# Patient Record
Sex: FEMALE | Race: WHITE | Employment: OTHER | URBAN - METROPOLITAN AREA
[De-identification: names, ages, dates, MRNs, and addresses within clinical notes are randomized per-mention and may not be internally consistent; named-entity substitution may affect disease eponyms.]

---

## 2019-02-04 ENCOUNTER — TELEPHONE (OUTPATIENT)
Dept: ORTHOPEDIC SURGERY | Age: 74
End: 2019-02-04

## 2019-02-04 ENCOUNTER — OFFICE VISIT (OUTPATIENT)
Dept: ORTHOPEDIC SURGERY | Age: 74
End: 2019-02-04
Payer: COMMERCIAL

## 2019-02-04 VITALS
WEIGHT: 180 LBS | SYSTOLIC BLOOD PRESSURE: 133 MMHG | HEIGHT: 64 IN | HEART RATE: 72 BPM | DIASTOLIC BLOOD PRESSURE: 73 MMHG | BODY MASS INDEX: 30.73 KG/M2

## 2019-02-04 DIAGNOSIS — M25.512 LEFT SHOULDER PAIN, UNSPECIFIED CHRONICITY: Primary | ICD-10-CM

## 2019-02-04 PROCEDURE — 99204 OFFICE O/P NEW MOD 45 MIN: CPT | Performed by: ORTHOPAEDIC SURGERY

## 2019-02-12 ENCOUNTER — OFFICE VISIT (OUTPATIENT)
Dept: ORTHOPEDIC SURGERY | Age: 74
End: 2019-02-12
Payer: COMMERCIAL

## 2019-02-12 VITALS
BODY MASS INDEX: 30.73 KG/M2 | HEIGHT: 64 IN | WEIGHT: 180 LBS | HEART RATE: 78 BPM | SYSTOLIC BLOOD PRESSURE: 142 MMHG | DIASTOLIC BLOOD PRESSURE: 78 MMHG

## 2019-02-12 DIAGNOSIS — M19.012 ARTHROSIS OF LEFT ACROMIOCLAVICULAR JOINT: ICD-10-CM

## 2019-02-12 DIAGNOSIS — M75.112 INCOMPLETE TEAR OF LEFT ROTATOR CUFF: Primary | ICD-10-CM

## 2019-02-12 PROCEDURE — 99213 OFFICE O/P EST LOW 20 MIN: CPT | Performed by: ORTHOPAEDIC SURGERY

## 2019-02-18 ENCOUNTER — OFFICE VISIT (OUTPATIENT)
Dept: ORTHOPEDIC SURGERY | Age: 74
End: 2019-02-18
Payer: COMMERCIAL

## 2019-02-18 VITALS
SYSTOLIC BLOOD PRESSURE: 136 MMHG | HEIGHT: 64 IN | BODY MASS INDEX: 30.73 KG/M2 | DIASTOLIC BLOOD PRESSURE: 76 MMHG | HEART RATE: 78 BPM | WEIGHT: 180 LBS

## 2019-02-18 DIAGNOSIS — M75.112 INCOMPLETE TEAR OF LEFT ROTATOR CUFF: Primary | ICD-10-CM

## 2019-02-18 DIAGNOSIS — M19.012 ARTHROSIS OF LEFT ACROMIOCLAVICULAR JOINT: ICD-10-CM

## 2019-02-18 PROCEDURE — 99213 OFFICE O/P EST LOW 20 MIN: CPT | Performed by: ORTHOPAEDIC SURGERY

## 2019-02-18 PROCEDURE — 20610 DRAIN/INJ JOINT/BURSA W/O US: CPT | Performed by: ORTHOPAEDIC SURGERY

## 2019-05-20 ENCOUNTER — OFFICE VISIT (OUTPATIENT)
Dept: ORTHOPEDIC SURGERY | Age: 74
End: 2019-05-20
Payer: COMMERCIAL

## 2019-05-20 VITALS
HEIGHT: 64 IN | BODY MASS INDEX: 30.71 KG/M2 | WEIGHT: 179.9 LBS | HEART RATE: 65 BPM | DIASTOLIC BLOOD PRESSURE: 65 MMHG | SYSTOLIC BLOOD PRESSURE: 120 MMHG

## 2019-05-20 DIAGNOSIS — M75.112 INCOMPLETE TEAR OF LEFT ROTATOR CUFF: Primary | ICD-10-CM

## 2019-05-20 DIAGNOSIS — M19.012 ARTHROSIS OF LEFT ACROMIOCLAVICULAR JOINT: ICD-10-CM

## 2019-05-20 PROCEDURE — L3670 SO ACRO/CLAV CAN WEB PRE OTS: HCPCS | Performed by: ORTHOPAEDIC SURGERY

## 2019-05-20 PROCEDURE — 99214 OFFICE O/P EST MOD 30 MIN: CPT | Performed by: ORTHOPAEDIC SURGERY

## 2019-05-20 NOTE — PROGRESS NOTES
History of Present Illness:  Rishi Bell is a 72 y.o. female here today for follow up reevaluation of the left shoulder. She was seen last week with a partial thickness rotator cuff tear, biceps tendinitis and acromioclavicular joint osteoarthritis as well as stiffness in her shoulder. Her last steroid injection was on 2/18/19. She is here today because she has elected to proceed with surgery and would like to undergo arthroscopic left shoulder rotator cuff repair, biceps tenotomy, capsular releases, subacromial decompression, distal clavicle excision. She still has to be cleared by her cardiologist as well as her regular doctor. She denies any new injuries. She denies any other current complaints this time.       Pain Assessment  Location of Pain: Shoulder  Location Modifiers: Left  Severity of Pain: 4  Quality of Pain: Sharp, Aching  Duration of Pain: Persistent  Frequency of Pain: Intermittent  Aggravating Factors: (raising arm )  Limiting Behavior: Yes  Relieving Factors: Rest  Result of Injury: No  Work-Related Injury: No  Are there other pain locations you wish to document?: No    Past Medical History:   Diagnosis Date    Anxiety     CAD (coronary artery disease) 2004    Chronic low back pain     Foot fracture 09/2016    right     GERD (gastroesophageal reflux disease)     Hearing loss     History of blood transfusion     History of MI (myocardial infarction)     HLD (hyperlipidemia)     HTN (hypertension)     IBS (irritable bowel syndrome)     Lumbar radiculopathy     Neuropathy     feet    Spinal stenosis     Type 2 diabetes mellitus (HCC)         Past Surgical History:   Procedure Laterality Date    BACK SURGERY  10/31/2016    L4-L5 RE-EXPLORATION AND REPLACEMENT OF INTERBODY BONE GRAFT    BLADDER SUSPENSION      CARDIAC SURGERY      TRIPLE BYPASS    CARDIAC SURGERY      STENT     COLONOSCOPY      OTHER SURGICAL HISTORY N/A 09/01/2016    L3-L4, L4-L5 TRANSFORAMINAL LUMBAR INTERBODY FUSION WITH AIRO    SHOULDER SURGERY Right     SHOULDER TEAR REPAIR       Family History   Problem Relation Age of Onset    Alzheimer's Disease Mother     Heart Disease Father        Social History     Socioeconomic History    Marital status:      Spouse name: None    Number of children: None    Years of education: None    Highest education level: None   Occupational History    None   Social Needs    Financial resource strain: None    Food insecurity:     Worry: None     Inability: None    Transportation needs:     Medical: None     Non-medical: None   Tobacco Use    Smoking status: Never Smoker    Smokeless tobacco: Never Used   Substance and Sexual Activity    Alcohol use: No    Drug use: No    Sexual activity: None   Lifestyle    Physical activity:     Days per week: None     Minutes per session: None    Stress: None   Relationships    Social connections:     Talks on phone: None     Gets together: None     Attends Restoration service: None     Active member of club or organization: None     Attends meetings of clubs or organizations: None     Relationship status: None    Intimate partner violence:     Fear of current or ex partner: None     Emotionally abused: None     Physically abused: None     Forced sexual activity: None   Other Topics Concern    None   Social History Narrative    None       Current Outpatient Medications   Medication Sig Dispense Refill    gabapentin (NEURONTIN) 100 MG capsule Take 100 mg by mouth 3 tabs in morning and evening, 2 tabs during day      nitrofurantoin (MACRODANTIN) 100 MG capsule Take 100 mg by mouth daily      carvedilol (COREG) 12.5 MG tablet Take 1 tablet by mouth 2 times daily (with meals) 60 tablet 3    amLODIPine (NORVASC) 5 MG tablet Take 1 tablet by mouth daily 30 tablet 3    glimepiride (AMARYL) 2 MG tablet Take 2 mg by mouth every morning (before breakfast)      diphenoxylate-atropine (LOMOTIL) 2.5-0.025 MG per tablet Take 1 tablet by mouth 4 times daily as needed for Diarrhea      potassium chloride (MICRO-K) 10 MEQ CR capsule Take 10 mEq by mouth daily      furosemide (LASIX) 40 MG tablet Take 40 mg by mouth daily      cetirizine (ZYRTEC) 10 MG tablet Take 10 mg by mouth daily       Liraglutide (VICTOZA) 18 MG/3ML SOPN SC injection INJECT 1.8 MG UNDER THE SKIN DAILY      fluticasone (FLONASE) 50 MCG/ACT nasal spray 1 spray by Nasal route as needed daily.  ZETIA 10 MG tablet 10 mg nightly       losartan (COZAAR) 100 MG tablet 100 mg nightly       metFORMIN (GLUCOPHAGE) 1000 MG tablet 1,000 mg 2 times daily (with meals)       ondansetron (ZOFRAN) 8 MG tablet 8 mg as needed       promethazine (PHENERGAN) 25 MG tablet 25 mg every 6 hours as needed       sertraline (ZOLOFT) 100 MG tablet 100 mg nightly        No current facility-administered medications for this visit. Allergies   Allergen Reactions    Metoclopramide Hcl     Morphine Other (See Comments)     HALLUCINATIONS    Percocet [Oxycodone-Acetaminophen] Itching     PATIENT CAN TAKE BUT NEEDS TO TAKE BENADRYL WITH IT       Vital signs:  /65   Pulse 65   Ht 5' 4.02\" (1.626 m)   Wt 179 lb 14.3 oz (81.6 kg)   BMI 30.86 kg/m²      Neuro: Alert & oriented x 3,  normal,  no focal deficits noted. Normal affect. Eyes: sclera clear  Ears: Normal external ear  Mouth:  No perioral lesions  Pulm: Respirations unlabored and regular  Pulse: Regular rate and rhythm   Skin: Warm, well perfused    Constitutional: The physical examination finds the patient to be well-developed and well-nourished. The patient is alert and oriented x3 and was cooperative throughout the visit. LEFT Shoulder Examination:    Inspection:  Held in a normal posture. Normal contour at the acromioclavicular joint. No swelling, ecchymosis, or erythema about the shoulder. No atrophy appreciated.     Palpation:  Tenderness over the anterior subacromial space/rotator cuff footprint, tenderness over the bicipital groove, tenderness over the acromioclavicular joint and posterior capsule. No subacromial crepitus. Range of Motion:  Normal scapulothoracic rhythm. Limited internal rotation to T9, crossarm adduction demonstrates tightness in the posterior shoulder with approximately 20 cm to the contralateral antecubital fossa. Strength:  Normal teres minor and subscapularis muscle strength. Supraspinatus weakness 4/5, infraspinatus 4+5     Stability: No anterior instability. No posterior instability. Special Tests:  Crossover sign is negative. Belly press sign is negative. Lift off sign is negative. Impingement findings are positive. Labral findings are negative. Speed sign and Yergason signs are both negative. Other findings: The skin is warm dry and well perfused. Distally neurovascularly intact. Sensation is intact to light touch over the deltoid. Comparison RIGHT Shoulder Examination:    Inspection:  Held in a normal posture. Normal contour at the acromioclavicular joint. No swelling, ecchymosis, or erythema about the shoulder. No atrophy appreciated. Palpation:  No subacromial crepitus. Range of Motion: Full passive and active ROM. Normal scapulothoracic rhythm. Strength:  Normal supraspinatus, infraspinatus, and subscapularis muscle strength. Stability: No anterior instability. No posterior instability. Special Tests:  Crossover sign is negative. Belly press sign is negative. Lift off sign is negative. Impingement findings are negative. Labral findings are negative. Speed sign and Yergason signs are both negative. Other findings: The skin is warm dry and well perfused. Distally neurovascularly intact. Sensation is intact to light touch over the deltoid. Radiology:     MRI imaging:  CONCLUSION:   1. Moderate-severe tendinopathy of supraspinatus and infraspinatus with 2 x 0.5cm interstitial    insertional tear, progressed from the previous study.    2. Moderate glenohumeral arthrosis with spurring, subcortical pseudocysts and chronic labral    tear, new since the prior study. 3. Chronic active AC arthropathy, progressed. Assessment :  Left shoulder pain secondary to partial thickness rotator cuff tear, bicipital tendinopathy, impingement, posterior capsular tightness, and acromioclavicular joint osteoarthritis. Impression:  Encounter Diagnoses   Name Primary?  Arthrosis of left acromioclavicular joint Yes    Incomplete tear of left rotator cuff        Office Procedures:  Orders Placed This Encounter   Procedures    DJO ultrasling IV Shoulder Sling     Patient was prescribed a DJO Ultrasling IV Shoulder Brace. The left shoulder will require stabilization / immobilization from this orthosis. The orthosis will assist in protecting the affected area, provide functional support and facilitate healing. The device was ordered and fit on 5/20/19. The patient was educated and fit by a healthcare professional with expert knowledge and specialization in brace application while under the direct supervision of the treating physician. Verbal and written instructions for the use of and application of this item were provided. They were instructed to contact the office immediately should the brace result in increased pain, decreased sensation, increased swelling or worsening of the condition. Plan: I discussed with the patient nonoperative and operative treatment options. She is a all nonoperative treatment options. She would like to proceed with a left shoulder arthroscopic rotator cuff debridement versus repair, biceps tenotomy, distal clavicle excision, subacromial decompression, posterior capsular release, and all other indicated procedures. Risks, benefits, advantages, disadvantages, potential complications, and anticipated postoperative course of all treatment options were discussed at length.   She agreed to pursue these surgical treatment Arthroscopic Knee and Shoulder Surgery    This dictation was performed with a verbal recognition program (DRAGON) and it was checked for errors. It is possible that there are still dictated errors within this office note. If so, please bring any errors to my attention for an addendum. All efforts were made to ensure that this office note is accurate.

## 2019-05-31 NOTE — PROGRESS NOTES
[facility] / [facility] ([facility]) [address] [facility] [facility], [address]    Acknowledgment of Informed Consent for Surgical or Medical Procedure and Sedation  I agree to allow doctor(s) Dee Diaz and his/her associates or assistants, including residents and/or other qualified medical practitioner to perform the following medical treatment or procedure and to administer or direct the administration of sedation as necessary:  Procedure(s): LEFT SHOULDER ARTHROSCOPY, ROTATOR CUFF REPAIR, SUBACROMIAL DECOMPRESSION, LABRAL DEBRIDEMENT  My doctor has explained the following regarding the proposed procedure:   the explanation of the procedure   the benefits of the procedure   the potential problems that might occur during recuperation   the risks and side effects of the procedure which could include but are not limited to severe blood loss, infection, stroke or death   the benefits, risks and side effect of alternative procedures including the consequences of declining this procedure or any alternative procedures   the likelihood of achieving satisfactory results. I acknowledge no guarantee or assurance has been made to me regarding the results. I understand that during the course of this treatment/procedure, unforeseen conditions can occur which require an additional or different procedure. I agree to allow my physician or assistants to perform such extension of the original procedure as they may find necessary. I understand that sedation will often result in temporary impairment of memory and fine motor skills and that sedation can occasionally progress to a state of deep sedation or general anesthesia. I understand the risks of anesthesia for surgery include, but are not limited to, sore throat, hoarseness, injury to face, mouth, or teeth; nausea; headache; injury to blood vessels or nerves; death, brain damage, or paralysis.     I understand that if I have a Limitation of Treatment order

## 2019-06-05 ENCOUNTER — ANESTHESIA EVENT (OUTPATIENT)
Dept: OPERATING ROOM | Age: 74
End: 2019-06-05
Payer: MEDICARE

## 2019-06-05 RX ORDER — ASPIRIN 81 MG/1
81 TABLET, CHEWABLE ORAL DAILY
Status: ON HOLD | COMMUNITY
End: 2019-06-06 | Stop reason: HOSPADM

## 2019-06-05 RX ORDER — OMEGA-3/DHA/EPA/FISH OIL 60 MG-90MG
CAPSULE ORAL
COMMUNITY

## 2019-06-05 RX ORDER — FUROSEMIDE 40 MG/1
40 TABLET ORAL 2 TIMES DAILY
Status: ON HOLD | COMMUNITY
End: 2019-06-06

## 2019-06-05 RX ORDER — CLOPIDOGREL BISULFATE 75 MG/1
75 TABLET ORAL DAILY
COMMUNITY

## 2019-06-05 NOTE — PROGRESS NOTES
[address]                          Date of Procedure 6/6/19  Time of Procedure 0800    PRIOR TO PROCEDURE DATE:  1. Please follow any guidelines/instructions prior to your procedure as advised by your surgeon. 2. Arrange for someone to drive you home and be with you for the first 24 hours after discharge for your safety after your procedure for which you received sedation. Ensure it is someone we can share information with regarding your discharge. 3. You must contact your surgeon for instructions IF:   You are taking any blood thinners, aspirin, anti-inflammatory or vitamin E.   There is a change in your physical condition such as a cold, fever, rash, cuts, sores or any other infection, especially near your surgical site. 4. Do not drink alcohol the day before or day of your procedure. 5. A Pre-op History and Physical for surgery MUST be completed by your Physician or Urgent Care within 30 days of your procedure date. Please bring a copy with you on the day of your procedure and along with any other testing performed. THE DAY OF YOUR PROCEDURE:  1. Follow instructions for ARRIVAL TIME as DIRECTED BY YOUR SURGEON. If your surgeon does not give you a specific arrival time, please arrive at 0600.    2. Enter the MAIN entrance from [facility] and follow the signs to the free [employer] or [employer] parking (offered free of charge 6am-5pm). 3. Enter the Main Entrance of the hospital (do not enter from the lower level of the parking garage). Upon entrance, check in with the  at the main desk on your left. If no one is available at the desk, proceed into the [facility] Waiting Room and go through the door directly into the [facility]. There is a Check-in desk ACROSS from Room 5 (marked with a sign hanging from the ceiling). The phone number for the surgery center is [phone].     4. Please call [phone] option #2 option #2 you may bring a bag with personal items. Please have any large items you may need brought in by your family after your arrival to your hospital room. 15. If you have a Living Will or Durable Power of , please bring a copy on the day of your procedure. 15. With your permission, one family member may accompany you while you are being prepared for surgery. Once you are ready, additional family members may join you. HOW WE KEEP YOU SAFE and WORK TO PREVENT SURGICAL SITE INFECTIONS:  1. Health care workers should always check your ID bracelet to verify your name and birth date. You will be asked many times to state your name, date of birth, and allergies. 2. Health care workers should always clean their hands with soap or alcohol gel before providing care to you. It is okay to ask anyone if they cleaned their hands before they touch you. 3. You will be actively involved in verifying the type of procedure you are having and ensuring the correct surgical site. This will be confirmed multiple times prior to your procedure. Do NOT marilu your surgery site UNLESS instructed to by your surgeon. 4. Do not shave or wax for 72 hours prior to procedure near your operative site. Shaving with a razor can irritate your skin and make it easier to develop an infection. On the day of your procedure, any hair that needs to be removed near the surgical site will be clipped by a healthcare worker using a special clippers designed to avoid skin irritation. 5. When you are in the operating room, your surgical site will be cleansed with a special soap, and in most cases, you will be given an antibiotic before the surgery begins. What to expect AFTER YOUR PROCEDURE:  1. Immediately following your procedure, your will be taken to the PACU for the first phase of your recovery.   Your nurse will help you recover from any potential side effects of anesthesia, such as extreme drowsiness, changes in your vital signs or breathing patterns. Nausea, headache, muscle aches, or sore throat may also occur after anesthesia. Your nurse will help you manage these potential side effects. 2. For comfort and safety, arrange to have someone at home with you for the first 24 hours after discharge. 3. You and your family will be given written instructions about your diet, activity, dressing care, medications, and return visits. 4. Once at home, should issues with nausea, pain, or bleeding occur, or should you notice any signs of infection, you should call your surgeon. 5. Always clean your hands before and after caring for your wound. Do not let your family touch your surgery site without cleaning their hands. 6. Narcotic pain medications can cause significant constipation. You may want to add a stool softener to your postoperative medication schedule or speak to your surgeon on how best to manage this SIDE EFFECT. SPECIAL INSTRUCTIONS   Thank you for allowing us to care for you. We strive to exceed your expectations in the delivery of care and service provided to you and your family. If you need to contact us for any reason, please call us at [phone]    Instructions reviewed with patient during preadmission testing phone interview. Ivan Crabtree. 6/5/2019 .12:53 PM      ADDITIONAL EDUCATIONAL INFORMATION REVIEWED PER PHONE WITH YOU AND/OR YOUR FAMILY:  No Bring a urine sample on day of surgery  Yes Pain Goal-Taking Control of Your Pain  Yes FAQs about Surgical Site Infections  Yes Hibiclens® Bathing Instructions   No Antibacterial Soap   PC:[id number]} [employer]ano® Wipes Bathing Instructions (Obtained from: [email or web address] pdf )  No  Incentive Spirometer Education

## 2019-06-06 ENCOUNTER — HOSPITAL ENCOUNTER (OUTPATIENT)
Age: 74
Setting detail: OUTPATIENT SURGERY
Discharge: HOME | End: 2019-06-06
Attending: ORTHOPAEDIC SURGERY | Admitting: ORTHOPAEDIC SURGERY
Payer: MEDICARE

## 2019-06-06 ENCOUNTER — ANESTHESIA (OUTPATIENT)
Dept: OPERATING ROOM | Age: 74
End: 2019-06-06
Payer: MEDICARE

## 2019-06-06 VITALS
RESPIRATION RATE: 16 BRPM | OXYGEN SATURATION: 95 % | BODY MASS INDEX: 30.39 KG/M2 | DIASTOLIC BLOOD PRESSURE: 65 MMHG | TEMPERATURE: 97.3 F | HEART RATE: 75 BPM | WEIGHT: 178 LBS | SYSTOLIC BLOOD PRESSURE: 133 MMHG | HEIGHT: 64 IN

## 2019-06-06 VITALS — SYSTOLIC BLOOD PRESSURE: 164 MMHG | OXYGEN SATURATION: 97 % | TEMPERATURE: 96.8 F | DIASTOLIC BLOOD PRESSURE: 79 MMHG

## 2019-06-06 PROBLEM — M75.112 INCOMPLETE ROTATOR CUFF TEAR OR RUPTURE OF LEFT SHOULDER, NOT SPECIFIED AS TRAUMATIC: Status: ACTIVE | Noted: 2019-06-06

## 2019-06-06 LAB
GLUCOSE BLD-MCNC: 141 MG/DL (ref 70–99)
GLUCOSE BLD-MCNC: 150 MG/DL (ref 70–99)
PERFORMED ON: ABNORMAL
PERFORMED ON: ABNORMAL

## 2019-06-06 PROCEDURE — 7100000010 HC PHASE II RECOVERY - FIRST 15 MIN: Performed by: ORTHOPAEDIC SURGERY

## 2019-06-06 PROCEDURE — 2580000003 HC RX 258: Performed by: ANESTHESIOLOGY

## 2019-06-06 PROCEDURE — 6360000002 HC RX W HCPCS: Performed by: NURSE ANESTHETIST, CERTIFIED REGISTERED

## 2019-06-06 PROCEDURE — 6360000002 HC RX W HCPCS: Performed by: ORTHOPAEDIC SURGERY

## 2019-06-06 PROCEDURE — 3700000001 HC ADD 15 MINUTES (ANESTHESIA): Performed by: ORTHOPAEDIC SURGERY

## 2019-06-06 PROCEDURE — 64415 NJX AA&/STRD BRCH PLXS IMG: CPT | Performed by: ANESTHESIOLOGY

## 2019-06-06 PROCEDURE — 7100000011 HC PHASE II RECOVERY - ADDTL 15 MIN: Performed by: ORTHOPAEDIC SURGERY

## 2019-06-06 PROCEDURE — 7100000000 HC PACU RECOVERY - FIRST 15 MIN: Performed by: ORTHOPAEDIC SURGERY

## 2019-06-06 PROCEDURE — 2500000003 HC RX 250 WO HCPCS: Performed by: NURSE ANESTHETIST, CERTIFIED REGISTERED

## 2019-06-06 PROCEDURE — 2709999900 HC NON-CHARGEABLE SUPPLY: Performed by: ORTHOPAEDIC SURGERY

## 2019-06-06 PROCEDURE — 3600000004 HC SURGERY LEVEL 4 BASE: Performed by: ORTHOPAEDIC SURGERY

## 2019-06-06 PROCEDURE — 2720000010 HC SURG SUPPLY STERILE: Performed by: ORTHOPAEDIC SURGERY

## 2019-06-06 PROCEDURE — L3650 SO 8 ABD RESTRAINT PRE OTS: HCPCS | Performed by: ORTHOPAEDIC SURGERY

## 2019-06-06 PROCEDURE — 3600000014 HC SURGERY LEVEL 4 ADDTL 15MIN: Performed by: ORTHOPAEDIC SURGERY

## 2019-06-06 PROCEDURE — 3700000000 HC ANESTHESIA ATTENDED CARE: Performed by: ORTHOPAEDIC SURGERY

## 2019-06-06 PROCEDURE — 7100000001 HC PACU RECOVERY - ADDTL 15 MIN: Performed by: ORTHOPAEDIC SURGERY

## 2019-06-06 PROCEDURE — 6360000002 HC RX W HCPCS: Performed by: ANESTHESIOLOGY

## 2019-06-06 PROCEDURE — 2580000003 HC RX 258: Performed by: ORTHOPAEDIC SURGERY

## 2019-06-06 PROCEDURE — C1713 ANCHOR/SCREW BN/BN,TIS/BN: HCPCS | Performed by: ORTHOPAEDIC SURGERY

## 2019-06-06 DEVICE — ANCHOR SUT L14.7MM DIA5.5MM 2 NO2 TIGERTAIL FULL THRD: Type: IMPLANTABLE DEVICE | Site: SHOULDER | Status: FUNCTIONAL

## 2019-06-06 RX ORDER — LIDOCAINE HYDROCHLORIDE 20 MG/ML
INJECTION, SOLUTION INTRAVENOUS PRN
Status: DISCONTINUED | OUTPATIENT
Start: 2019-06-06 | End: 2019-06-06 | Stop reason: SDUPTHER

## 2019-06-06 RX ORDER — ROCURONIUM BROMIDE 10 MG/ML
INJECTION, SOLUTION INTRAVENOUS PRN
Status: DISCONTINUED | OUTPATIENT
Start: 2019-06-06 | End: 2019-06-06 | Stop reason: SDUPTHER

## 2019-06-06 RX ORDER — ONDANSETRON 2 MG/ML
INJECTION INTRAMUSCULAR; INTRAVENOUS PRN
Status: DISCONTINUED | OUTPATIENT
Start: 2019-06-06 | End: 2019-06-06 | Stop reason: SDUPTHER

## 2019-06-06 RX ORDER — MIDAZOLAM HYDROCHLORIDE 1 MG/ML
INJECTION INTRAMUSCULAR; INTRAVENOUS PRN
Status: DISCONTINUED | OUTPATIENT
Start: 2019-06-06 | End: 2019-06-06 | Stop reason: SDUPTHER

## 2019-06-06 RX ORDER — FENTANYL CITRATE 50 UG/ML
INJECTION, SOLUTION INTRAMUSCULAR; INTRAVENOUS
Status: COMPLETED
Start: 2019-06-06 | End: 2019-06-06

## 2019-06-06 RX ORDER — PROMETHAZINE HYDROCHLORIDE 25 MG/ML
6.25 INJECTION, SOLUTION INTRAMUSCULAR; INTRAVENOUS
Status: DISCONTINUED | OUTPATIENT
Start: 2019-06-06 | End: 2019-06-06 | Stop reason: HOSPADM

## 2019-06-06 RX ORDER — SODIUM CHLORIDE, SODIUM LACTATE, POTASSIUM CHLORIDE, CALCIUM CHLORIDE 600; 310; 30; 20 MG/100ML; MG/100ML; MG/100ML; MG/100ML
INJECTION, SOLUTION INTRAVENOUS CONTINUOUS
Status: DISCONTINUED | OUTPATIENT
Start: 2019-06-06 | End: 2019-06-06 | Stop reason: HOSPADM

## 2019-06-06 RX ORDER — PROPOFOL 10 MG/ML
INJECTION, EMULSION INTRAVENOUS PRN
Status: DISCONTINUED | OUTPATIENT
Start: 2019-06-06 | End: 2019-06-06 | Stop reason: SDUPTHER

## 2019-06-06 RX ORDER — FENTANYL CITRATE 50 UG/ML
50 INJECTION, SOLUTION INTRAMUSCULAR; INTRAVENOUS EVERY 5 MIN PRN
Status: DISCONTINUED | OUTPATIENT
Start: 2019-06-06 | End: 2019-06-06 | Stop reason: HOSPADM

## 2019-06-06 RX ORDER — ONDANSETRON 2 MG/ML
4 INJECTION INTRAMUSCULAR; INTRAVENOUS
Status: DISCONTINUED | OUTPATIENT
Start: 2019-06-06 | End: 2019-06-06 | Stop reason: HOSPADM

## 2019-06-06 RX ORDER — HYDRALAZINE HYDROCHLORIDE 20 MG/ML
5 INJECTION INTRAMUSCULAR; INTRAVENOUS EVERY 10 MIN PRN
Status: DISCONTINUED | OUTPATIENT
Start: 2019-06-06 | End: 2019-06-06 | Stop reason: HOSPADM

## 2019-06-06 RX ORDER — ROPIVACAINE HYDROCHLORIDE 5 MG/ML
INJECTION, SOLUTION EPIDURAL; INFILTRATION; PERINEURAL
Status: COMPLETED
Start: 2019-06-06 | End: 2019-06-06

## 2019-06-06 RX ORDER — MIDAZOLAM HYDROCHLORIDE 1 MG/ML
INJECTION INTRAMUSCULAR; INTRAVENOUS
Status: COMPLETED
Start: 2019-06-06 | End: 2019-06-06

## 2019-06-06 RX ORDER — CEFAZOLIN SODIUM 2 G/50ML
2 SOLUTION INTRAVENOUS ONCE
Status: COMPLETED | OUTPATIENT
Start: 2019-06-06 | End: 2019-06-06

## 2019-06-06 RX ORDER — LABETALOL HYDROCHLORIDE 5 MG/ML
5 INJECTION, SOLUTION INTRAVENOUS EVERY 10 MIN PRN
Status: DISCONTINUED | OUTPATIENT
Start: 2019-06-06 | End: 2019-06-06 | Stop reason: HOSPADM

## 2019-06-06 RX ORDER — MAGNESIUM HYDROXIDE 1200 MG/15ML
LIQUID ORAL CONTINUOUS PRN
Status: COMPLETED | OUTPATIENT
Start: 2019-06-06 | End: 2019-06-06

## 2019-06-06 RX ORDER — PANTOPRAZOLE SODIUM 40 MG/1
40 GRANULE, DELAYED RELEASE ORAL
COMMUNITY

## 2019-06-06 RX ORDER — FENTANYL CITRATE 50 UG/ML
25 INJECTION, SOLUTION INTRAMUSCULAR; INTRAVENOUS EVERY 5 MIN PRN
Status: DISCONTINUED | OUTPATIENT
Start: 2019-06-06 | End: 2019-06-06 | Stop reason: HOSPADM

## 2019-06-06 RX ORDER — DEXAMETHASONE SODIUM PHOSPHATE 4 MG/ML
INJECTION, SOLUTION INTRA-ARTICULAR; INTRALESIONAL; INTRAMUSCULAR; INTRAVENOUS; SOFT TISSUE PRN
Status: DISCONTINUED | OUTPATIENT
Start: 2019-06-06 | End: 2019-06-06 | Stop reason: SDUPTHER

## 2019-06-06 RX ORDER — FENTANYL CITRATE 50 UG/ML
INJECTION, SOLUTION INTRAMUSCULAR; INTRAVENOUS PRN
Status: DISCONTINUED | OUTPATIENT
Start: 2019-06-06 | End: 2019-06-06 | Stop reason: SDUPTHER

## 2019-06-06 RX ORDER — ROPIVACAINE HYDROCHLORIDE 5 MG/ML
INJECTION, SOLUTION EPIDURAL; INFILTRATION; PERINEURAL PRN
Status: DISCONTINUED | OUTPATIENT
Start: 2019-06-06 | End: 2019-06-06 | Stop reason: SDUPTHER

## 2019-06-06 RX ADMIN — ROCURONIUM BROMIDE 50 MG: 10 INJECTION, SOLUTION INTRAVENOUS at 08:27

## 2019-06-06 RX ADMIN — PHENYLEPHRINE HYDROCHLORIDE 80 MCG: 10 INJECTION, SOLUTION INTRAMUSCULAR; INTRAVENOUS; SUBCUTANEOUS at 08:58

## 2019-06-06 RX ADMIN — FENTANYL CITRATE 100 MCG: 50 INJECTION INTRAMUSCULAR; INTRAVENOUS at 07:45

## 2019-06-06 RX ADMIN — SODIUM CHLORIDE, SODIUM LACTATE, POTASSIUM CHLORIDE, AND CALCIUM CHLORIDE: 600; 310; 30; 20 INJECTION, SOLUTION INTRAVENOUS at 08:17

## 2019-06-06 RX ADMIN — PHENYLEPHRINE HYDROCHLORIDE 80 MCG: 10 INJECTION, SOLUTION INTRAMUSCULAR; INTRAVENOUS; SUBCUTANEOUS at 08:38

## 2019-06-06 RX ADMIN — SODIUM CHLORIDE, SODIUM LACTATE, POTASSIUM CHLORIDE, AND CALCIUM CHLORIDE: 600; 310; 30; 20 INJECTION, SOLUTION INTRAVENOUS at 07:00

## 2019-06-06 RX ADMIN — DEXAMETHASONE SODIUM PHOSPHATE 4 MG: 4 INJECTION, SOLUTION INTRAMUSCULAR; INTRAVENOUS at 09:30

## 2019-06-06 RX ADMIN — CEFAZOLIN SODIUM 2 G: 2 SOLUTION INTRAVENOUS at 08:29

## 2019-06-06 RX ADMIN — MIDAZOLAM HYDROCHLORIDE 2 MG: 2 INJECTION, SOLUTION INTRAMUSCULAR; INTRAVENOUS at 07:45

## 2019-06-06 RX ADMIN — PHENYLEPHRINE HYDROCHLORIDE 80 MCG: 10 INJECTION, SOLUTION INTRAMUSCULAR; INTRAVENOUS; SUBCUTANEOUS at 08:44

## 2019-06-06 RX ADMIN — LIDOCAINE HYDROCHLORIDE 100 MG: 20 INJECTION, SOLUTION INTRAVENOUS at 08:27

## 2019-06-06 RX ADMIN — PROPOFOL 130 MG: 10 INJECTION, EMULSION INTRAVENOUS at 08:27

## 2019-06-06 RX ADMIN — ONDANSETRON 4 MG: 2 INJECTION INTRAMUSCULAR; INTRAVENOUS at 09:30

## 2019-06-06 RX ADMIN — ROCURONIUM BROMIDE 30 MG: 10 INJECTION, SOLUTION INTRAVENOUS at 09:03

## 2019-06-06 RX ADMIN — ROPIVACAINE HYDROCHLORIDE 35 ML: 5 INJECTION, SOLUTION EPIDURAL; INFILTRATION; PERINEURAL at 07:25

## 2019-06-06 ASSESSMENT — PULMONARY FUNCTION TESTS
PIF_VALUE: 25
PIF_VALUE: 23
PIF_VALUE: 24
PIF_VALUE: 0
PIF_VALUE: 24
PIF_VALUE: 21
PIF_VALUE: 24
PIF_VALUE: 25
PIF_VALUE: 24
PIF_VALUE: 1
PIF_VALUE: 24
PIF_VALUE: 1
PIF_VALUE: 24
PIF_VALUE: 1
PIF_VALUE: 24
PIF_VALUE: 23
PIF_VALUE: 24
PIF_VALUE: 30
PIF_VALUE: 24
PIF_VALUE: 7
PIF_VALUE: 24
PIF_VALUE: 25
PIF_VALUE: 1
PIF_VALUE: 24
PIF_VALUE: 23
PIF_VALUE: 31
PIF_VALUE: 24
PIF_VALUE: 23
PIF_VALUE: 24
PIF_VALUE: 0
PIF_VALUE: 24
PIF_VALUE: 24
PIF_VALUE: 27
PIF_VALUE: 25
PIF_VALUE: 23
PIF_VALUE: 24
PIF_VALUE: 1
PIF_VALUE: 24
PIF_VALUE: 24
PIF_VALUE: 4
PIF_VALUE: 23
PIF_VALUE: 22
PIF_VALUE: 23
PIF_VALUE: 22
PIF_VALUE: 1
PIF_VALUE: 24
PIF_VALUE: 23
PIF_VALUE: 24
PIF_VALUE: 6
PIF_VALUE: 25
PIF_VALUE: 25
PIF_VALUE: 24
PIF_VALUE: 23
PIF_VALUE: 24
PIF_VALUE: 23
PIF_VALUE: 1
PIF_VALUE: 24
PIF_VALUE: 19
PIF_VALUE: 24
PIF_VALUE: 23
PIF_VALUE: 2
PIF_VALUE: 24
PIF_VALUE: 25
PIF_VALUE: 24
PIF_VALUE: 25
PIF_VALUE: 24
PIF_VALUE: 23
PIF_VALUE: 13
PIF_VALUE: 24
PIF_VALUE: 24
PIF_VALUE: 5
PIF_VALUE: 24
PIF_VALUE: 2
PIF_VALUE: 24
PIF_VALUE: 26
PIF_VALUE: 24
PIF_VALUE: 24
PIF_VALUE: 25
PIF_VALUE: 24
PIF_VALUE: 0
PIF_VALUE: 26
PIF_VALUE: 23
PIF_VALUE: 26
PIF_VALUE: 24
PIF_VALUE: 23
PIF_VALUE: 24
PIF_VALUE: 23
PIF_VALUE: 25
PIF_VALUE: 24

## 2019-06-06 ASSESSMENT — PAIN DESCRIPTION - DESCRIPTORS: DESCRIPTORS: ACHING;SHARP

## 2019-06-06 ASSESSMENT — PAIN SCALES - GENERAL
PAINLEVEL_OUTOF10: 0

## 2019-06-06 ASSESSMENT — PAIN - FUNCTIONAL ASSESSMENT
PAIN_FUNCTIONAL_ASSESSMENT: 0-10
PAIN_FUNCTIONAL_ASSESSMENT: 0-10
PAIN_FUNCTIONAL_ASSESSMENT: PREVENTS OR INTERFERES SOME ACTIVE ACTIVITIES AND ADLS

## 2019-06-06 NOTE — PROGRESS NOTES
Call to OR for Dr Gilma Valdovinos. A prescription will be sent to her [facility] for medication for nausea if it is needed. No nausea now. Walked to BR. Steady when up. Voided. Also discussed over the phone the patient can resume her usual anticoagulants today ( she takes low dose aspirin and Plavix). She said she will probably follow her cardiologist direction who said not to stop ASA and restart Plavix the day after surgery. Dressing remains clean. Sling positioning with elbow slightly  lower than hand.

## 2019-06-06 NOTE — ANESTHESIA PROCEDURE NOTES
Peripheral Block    Patient location during procedure: pre-op  Staffing  Anesthesiologist: Sadaf Simons DO  Preanesthetic Checklist  Completed: patient identified, site marked, surgical consent, pre-op evaluation, timeout performed, IV checked, risks and benefits discussed, monitors and equipment checked, anesthesia consent given, oxygen available and patient being monitored  Peripheral Block  Patient position: supine  Block type: Brachial plexus  Laterality: left  Injection technique: single-shot  Procedures: ultrasound guided  Interscalene  Needle  Needle gauge: 22 G  Needle localization: ultrasound guidance  Assessment  Injection assessment: negative aspiration for heme, no paresthesia on injection and local visualized surrounding nerve on ultrasound  Paresthesia pain: none  Slow fractionated injection: yes  Hemodynamics: stable  Additional Notes  35 mg 0.5% Ropivacaine injected in 5 mL increments following negative aspiration. Tip of needle in view at all times. Pt tolerated the procedure well.    Reason for block: procedure for pain, post-op pain management and at surgeon's request

## 2019-06-06 NOTE — BRIEF OP NOTE
Brief Postoperative Note  ______________________________________________________________    Patient: Nicole Sanchez  YOB: 1945  MRN: [id number]  Date of Procedure: 6/6/2019    Pre-Op Diagnosis: ROTATOR CUFF TEAR LEFT SHOULDER    Post-Op Diagnosis: Same       Procedure(s):  LEFT SHOULDER ARTHROSCOPY, ROTATOR CUFF REPAIR, SUBACROMIAL DECOMPRESSION, LABRAL DEBRIDEMENT    Anesthesia: Regional, General    Surgeon(s):   MD Gi Rao DO    Assistant: [address] [place], [place] [address]MAR[place]; Danny Valdez    Estimated Blood Loss (mL): less than 50     Complications: None    Specimens:   * No specimens in log *    Implants:  * No implants in log *      Drains: * No LDAs found *    Findings: please see operative note    [facility]  Date: 6/6/2019  Time: 10:18 AM

## 2019-06-06 NOTE — ANESTHESIA PRE PROCEDURE
Department of Anesthesiology  Preprocedure Note       Name:  Janet Summers   Age:  72 y.o.  :  1945                                          MRN:  [id number]         Date:  2019      Surgeon: Kyle Ronquillo): MD Thee Jiménez,     Procedure: LEFT SHOULDER ARTHROSCOPY, ROTATOR CUFF REPAIR, SUBACROMIAL DECOMPRESSION, LABRAL DEBRIDEMENT (Left )    Medications prior to admission:   Prior to Admission medications    Medication Sig Start Date End Date Taking?  Authorizing Provider   Semaglutide (OZEMPIC) 0.25 or 0.5 MG/DOSE SOPN Inject 0.5 mg into the skin once a week   Yes Historical Provider, MD   pantoprazole sodium (PROTONIX) 40 MG PACK packet Take 40 mg by mouth every morning (before breakfast)   Yes Historical Provider, MD   CRANBERRY EXTRACT PO Take by mouth   Yes Historical Provider, MD   clopidogrel (PLAVIX) 75 MG tablet Take 75 mg by mouth daily   Yes Historical Provider, MD   aspirin 81 MG chewable tablet Take 81 mg by mouth daily   Yes Historical Provider, MD   Lactobacillus (PROBIOTIC ACIDOPHILUS PO) Take by mouth   Yes Historical Provider, MD   Omega-3 Fatty Acids (FISH OIL) 500 MG CAPS Take by mouth   Yes Historical Provider, MD   gabapentin (NEURONTIN) 100 MG capsule Take 100 mg by mouth 3 tabs in morning and evening, 2 tabs during day   Yes Historical Provider, MD   carvedilol (COREG) 12.5 MG tablet Take 1 tablet by mouth 2 times daily (with meals) 16  Yes Minh Campos MD   amLODIPine (NORVASC) 5 MG tablet Take 1 tablet by mouth daily 9/3/16  Yes Avelino Wong MD   glimepiride (AMARYL) 2 MG tablet Take 2 mg by mouth every morning (before breakfast)   Yes Historical Provider, MD   diphenoxylate-atropine (LOMOTIL) 2.5-0.025 MG per tablet Take 1 tablet by mouth 4 times daily as needed for Diarrhea   Yes Historical Provider, MD   potassium chloride (MICRO-K) 10 MEQ CR capsule Take 10 mEq by mouth daily   Yes Historical Provider, MD   furosemide (LASIX) 40 Neuro/Psych:                ROS comment: Lumbar radiculopathy  GI/Hepatic/Renal:   (+) GERD:,           Endo/Other:    (+) DiabetesType II DM, , .                 Abdominal:           Vascular: negative vascular ROS. Anesthesia Plan      general and regional     ASA 3       Induction: intravenous. MIPS: Postoperative opioids intended and Prophylactic antiemetics administered. Anesthetic plan and risks discussed with patient. Plan discussed with CRNA.                   Rika Rodriguez DO   6/6/2019

## 2019-06-06 NOTE — OP NOTE
[address]                [address]                                OPERATIVE REPORT    PATIENT NAME: Jordi Muñoz                 :        1945  MED REC NO:   [id number]                          ROOM:  ACCOUNT NO:   [de-identified]                           ADMIT DATE: 2019  PROVIDER:     Jonathan Chapman MD    DATE OF PROCEDURE:  2019    OPERATIONS:  Left shoulder arthroscopy with labral debridement,  subacromial decompression, subacromial bursectomy, rotator cuff repair. SURGEON:  Jonathan Chapman MD    ASSISTANT:  Soni Espinoza PA-C    ORTHOPEDIC FELLOW:  Dr. Megan Herring. ANESTHESIA:  General.    PREOPERATIVE DIAGNOSES:  Rotator cuff tear; subacromial impingement;  labral tear, left shoulder. POSTOPERATIVE DIAGNOSES:  Rotator cuff tear; subacromial impingement;  labral tear, left shoulder. PREPARATION:  ChloraPrep. INDICATIONS:  This is a 79-year-old lady with left shoulder pain  refractory to physical therapy, antiinflammatory medications, presents  for arthroscopic evaluation and treatment. Risks and benefits of  surgery as well as nonsurgical alternatives were discussed with the  patient who understood and consented to the operation. PROCEDURE:  The patient was seen in the holding area where she confirmed  that the left shoulder was the operative extremity. She initialed the  operative site. She was given interscalene block, taken to the  operating room, and after induction of general anesthesia, she was  placed in the beach-chair position. Her head and neck were placed in  neutral alignment. Bony prominences were padded. Left upper extremity  was prepped and draped in a sterile fashion. A timeout was performed. The OR team agreed the left shoulder was the operative site. A  posterior portal was established. Scope was placed in the joint. The  shoulder was visualized sequentially. Degenerative labral tearing was  present superiorly, anteriorly, and posteriorly. It did not extend into  the biceps anchor. Labral debridement was performed of which the labrum  was debrided back to a stable base of tissue. Biceps tendon subluxated  and partial-thickness tears were evident. There was no evidence of  high-grade rotator cuff tear on the articular surface. The scope was  then placed in the subacromial space where 90% bursal-sided tear  involving the anterior aspect of the supraspinatus was evident. In  addition, there was fraying of coracoacromial ligament suggesting  impingement. Complete bursectomy was carried out. Subacromial  decompression was carried out using 4.5 mm ada. The greater tuberosity  was repaired, and a rotator cuff repair was carried out using a 5.5  Bio-Corkscrew suture anchor and two #2 FiberWire sutures, placed in a  simple configuration. This afforded excellent fixation of the tendon to  the bone. The scope was then removed from the joint. The incision was  closed with Monocryl sutures. Sterile dressing was applied. The  patient was awakened and taken to the recovery room in stable condition.   The sponge and needle counts correct at the conclusion of procedure and  blood loss was minimal.        Rosa Maria Jansen MD    D: 2019 9:55:47       T: 2019 14:35:19     /[email or web address]  Job#: [id number]     Doc#: [id number]    CC:

## 2019-06-06 NOTE — ANESTHESIA POSTPROCEDURE EVALUATION
Department of Anesthesiology  Postprocedure Note    Patient: Alize Redman  MRN: [id number]  YOB: 1945  Date of evaluation: 6/6/2019  Time:  4:51 PM     Procedure Summary     Date:  06/06/19 Room / Location:  [facility] OR 09 / [facility] OR    Anesthesia Start:  9719 Anesthesia Stop:  0619    Procedure:  LEFT SHOULDER ARTHROSCOPY, ROTATOR CUFF REPAIR, SUBACROMIAL DECOMPRESSION, LABRAL DEBRIDEMENT (Left ) Diagnosis:  (ROTATOR CUFF TEAR LEFT SHOULDER)    Surgeon:  Sela Schaumann, MD Responsible Provider:  Lulu Bergman DO    Anesthesia Type:  general, regional ASA Status:  3          Anesthesia Type: general, regional    Vamsi Phase I: Vamsi Score: 10    Vamsi Phase II: Vamsi Score: 10    Last vitals: Reviewed and per EMR flowsheets.        Anesthesia Post Evaluation    Patient location during evaluation: PACU  Patient participation: complete - patient participated  Level of consciousness: awake and alert  Airway patency: patent  Nausea & Vomiting: no nausea and no vomiting  Cardiovascular status: blood pressure returned to baseline  Respiratory status: acceptable  Hydration status: euvolemic

## 2019-06-06 NOTE — PROGRESS NOTES
1543 pt tolerated left interscalene block well. Done per DR Brent Mason. No problems. Pt resting post block with monitors and O2 in place. Family back in room with pt.

## 2019-06-06 NOTE — BRIEF OP NOTE
Brief Postoperative Note  ______________________________________________________________    Patient: Irma Hilario  YOB: 1945  MRN: [id number]  Date of Procedure: 6/6/2019    Pre-Op Diagnosis: ROTATOR CUFF TEAR LEFT SHOULDER    Post-Op Diagnosis: Same       Procedure(s):  LEFT SHOULDER ARTHROSCOPY, ROTATOR CUFF REPAIR, SUBACROMIAL DECOMPRESSION, LABRAL DEBRIDEMENT    Anesthesia: Regional, General    Surgeon(s):  DO Sue Quinteros MD    Assistant: Eva [facility]    Estimated Blood Loss (mL): less than 50     Complications: None    Specimens:   * No specimens in log *    Implants:  See operative report      Drains: * No LDAs found *    Findings: see operative report    Sergio Batres DO  Date: 6/6/2019  Time: 9:36 AM

## 2019-06-06 NOTE — H&P
Occupational History    None   Social Needs    Financial resource strain: None    Food insecurity:     Worry: None     Inability: None    Transportation needs:     Medical: None     Non-medical: None   Tobacco Use    Smoking status: Never Smoker    Smokeless tobacco: Never Used   Substance and Sexual Activity    Alcohol use: No    Drug use: No    Sexual activity: None   Lifestyle    Physical activity:     Days per week: None     Minutes per session: None    Stress: None   Relationships    Social connections:     Talks on phone: None     Gets together: None     Attends Buddhism service: None     Active member of club or organization: None     Attends meetings of clubs or organizations: None     Relationship status: None    Intimate partner violence:     Fear of current or ex partner: None     Emotionally abused: None     Physically abused: None     Forced sexual activity: None   Other Topics Concern    None   Social History Narrative    None         Medications Prior to Admission:      Prior to Admission medications    Medication Sig Start Date End Date Taking?  Authorizing Provider   CRANBERRY EXTRACT PO Take by mouth   Yes Historical Provider, MD   clopidogrel (PLAVIX) 75 MG tablet Take 75 mg by mouth daily   Yes Historical Provider, MD   aspirin 81 MG chewable tablet Take 81 mg by mouth daily   Yes Historical Provider, MD   NONFORMULARY    Yes Historical Provider, MD   Lactobacillus (PROBIOTIC ACIDOPHILUS PO) Take by mouth   Yes Historical Provider, MD   Omega-3 Fatty Acids (FISH OIL) 500 MG CAPS Take by mouth   Yes Historical Provider, MD   gabapentin (NEURONTIN) 100 MG capsule Take 100 mg by mouth 3 tabs in morning and evening, 2 tabs during day   Yes Historical Provider, MD   carvedilol (COREG) 12.5 MG tablet Take 1 tablet by mouth 2 times daily (with meals) 9/13/16  Yes Michael Marino MD   amLODIPine (NORVASC) 5 MG tablet Take 1 tablet by mouth daily 9/3/16  Yes Mar Francisco MD glimepiride (AMARYL) 2 MG tablet Take 2 mg by mouth every morning (before breakfast)   Yes Historical Provider, MD   diphenoxylate-atropine (LOMOTIL) 2.5-0.025 MG per tablet Take 1 tablet by mouth 4 times daily as needed for Diarrhea   Yes Historical Provider, MD   potassium chloride (MICRO-K) 10 MEQ CR capsule Take 10 mEq by mouth daily   Yes Historical Provider, MD   furosemide (LASIX) 40 MG tablet Take 40 mg by mouth daily   Yes Historical Provider, MD   cetirizine (ZYRTEC) 10 MG tablet Take 10 mg by mouth daily    Yes Historical Provider, MD   fluticasone (FLONASE) 50 MCG/ACT nasal spray 1 spray by Nasal route as needed daily. Yes Historical Provider, MD   ZETIA 10 MG tablet 10 mg nightly  1/20/14  Yes Historical Provider, MD   losartan (COZAAR) 100 MG tablet 100 mg nightly  4/15/14  Yes Historical Provider, MD   metFORMIN (GLUCOPHAGE) 1000 MG tablet 1,000 mg 2 times daily (with meals)  4/2/14  Yes Historical Provider, MD   ondansetron (ZOFRAN) 8 MG tablet 8 mg as needed  1/20/14  Yes Historical Provider, MD   sertraline (ZOLOFT) 100 MG tablet 100 mg nightly  4/16/14  Yes Historical Provider, MD   promethazine (PHENERGAN) 25 MG tablet 25 mg every 6 hours as needed  1/14/14   Historical Provider, MD         Allergies:  Metoclopramide hcl; Morphine; and Percocet [oxycodone-acetaminophen]    PHYSICAL EXAM:      BP (!) 172/79   Pulse 75   Temp 97.9 °F (36.6 °C) (Oral)   Resp 16   Ht 5' 4\" (1.626 m)   Wt 178 lb (80.7 kg)   SpO2 94%   BMI 30.55 kg/m²      Heart:  Regular rate and rhythm, No murmur noted    Lungs: No increased work of breathing, good air exchange, clear to ausculation bilaterally     Abdomen:  Soft, non-distended, non-tender, normal active bowel sounds, no masses palpated    ASSESSMENT AND PLAN:    1. Patient seen and focused exam done today- no new changes since last physical exam on 5/28/19    2. Access to ancillary services are available per request of the provider.     Darlene Muniz

## 2019-06-12 ENCOUNTER — HOSPITAL ENCOUNTER (OUTPATIENT)
Dept: PHYSICAL THERAPY | Age: 74
Setting detail: THERAPIES SERIES
Discharge: HOME | End: 2019-06-12
Payer: COMMERCIAL

## 2019-06-12 ENCOUNTER — OFFICE VISIT (OUTPATIENT)
Dept: ORTHOPEDIC SURGERY | Age: 74
End: 2019-06-12

## 2019-06-12 VITALS — BODY MASS INDEX: 30.39 KG/M2 | HEIGHT: 64 IN | WEIGHT: 178 LBS

## 2019-06-12 DIAGNOSIS — M19.012 ARTHROSIS OF LEFT ACROMIOCLAVICULAR JOINT: Primary | ICD-10-CM

## 2019-06-12 DIAGNOSIS — M75.112 INCOMPLETE TEAR OF LEFT ROTATOR CUFF, UNSPECIFIED WHETHER TRAUMATIC: ICD-10-CM

## 2019-06-12 DIAGNOSIS — M75.42 IMPINGEMENT SYNDROME OF LEFT SHOULDER: ICD-10-CM

## 2019-06-12 DIAGNOSIS — S43.432D TEAR OF LEFT GLENOID LABRUM, SUBSEQUENT ENCOUNTER: ICD-10-CM

## 2019-06-12 PROCEDURE — 97161 PT EVAL LOW COMPLEX 20 MIN: CPT

## 2019-06-12 PROCEDURE — 99024 POSTOP FOLLOW-UP VISIT: CPT | Performed by: ORTHOPAEDIC SURGERY

## 2019-06-12 PROCEDURE — 97110 THERAPEUTIC EXERCISES: CPT

## 2019-06-12 NOTE — FLOWSHEET NOTE
[place]          Manual interventions                     Therapeutic Exercise and NMR EXR  [x] (89475) Provided verbal/tactile cueing for activities related to strengthening, flexibility, endurance, ROM  for improvements in scapular, scapulothoracic and UE control with self care, reaching, carrying, lifting, house/yardwork, driving/computer work.    [] (06082) Provided verbal/tactile cueing for activities related to improving balance, coordination, kinesthetic sense, posture, motor skill, proprioception  to assist with  scapular, scapulothoracic and UE control with self care, reaching, carrying, lifting, house/yardwork, driving/computer work. Therapeutic Activities:    [x] (72789 or 67316) Provided verbal/tactile cueing for activities related to improving balance, coordination, kinesthetic sense, posture, motor skill, proprioception and motor activation to allow for proper function of scapular, scapulothoracic and UE control with self care, carrying, lifting, driving/computer work. 6/12: Reviewed proper posture, don/doff sling and proper fit of sling.      Home Exercise Program:    [x] (41667) Reviewed/Progressed HEP activities related to strengthening, flexibility, endurance, ROM of scapular, scapulothoracic and UE control with self care, reaching, carrying, lifting, house/yardwork, driving/computer work  [] (30135) Reviewed/Progressed HEP activities related to improving balance, coordination, kinesthetic sense, posture, motor skill, proprioception of scapular, scapulothoracic and UE control with self care, reaching, carrying, lifting, house/yardwork, driving/computer work      Manual Treatments:  PROM / STM / Oscillations-Mobs:  G-I, II, III, IV (PA's, Inf., Post.)  [] (83186) Provided manual therapy to mobilize soft tissue/joints of cervical/CT, scapular GHJ and UE for the purpose of modulating pain, promoting relaxation,  increasing ROM, reducing/eliminating soft tissue swelling/inflammation/restriction, improving soft tissue extensibility and allowing for proper ROM for normal function with self care, reaching, carrying, lifting, house/yardwork, driving/computer work    Modalities: CP x15'     Charges:  Timed Code Treatment Minutes: 15'+eval   Total Treatment Minutes: 3:35-4:23  48'       [x] EVAL (LOW) 85086 (typically 20 minutes face-to-face)  [] EVAL (MOD) 21173 (typically 30 minutes face-to-face)  [] EVAL (HIGH) 27078 (typically 45 minutes face-to-face)  [] RE-EVAL     [x] JM(78497) x  1   [] IONTO  [] NMR (62794) x      [] VASO  [] Manual (81505) x       [] Other:  [] TA x       [] Mech Traction (48188)  [] ES(attended) (07471)      [] ES (un) (42271):     GOALS:  Patient stated goal: Decrease pain and improve movement      Therapist goals for Patient:   Short Term Goals: To be achieved in: 2 weeks  1. Independent in HEP and progression per patient tolerance, in order to prevent re-injury. 2. Patient will have a decrease in pain to facilitate improvement in movement, function, and ADLs as indicated by Functional Deficits.     Long Term Goals: To be achieved in: 12 weeks  1. Disability index score of 20% or less for the UEFS to assist with reaching prior level of function. 2. Patient will demonstrate increased AROM that is equal to R to allow for proper joint functioning as indicated by patients Functional Deficits. 3. Patient will demonstrate an increase in Strength to good scapular and core control  for UE to allow for proper functional mobility as indicated by patients Functional Deficits. 4. Patient will return to all functional activities without increased symptoms or restriction. 5. Patient will be able to lift up to 5 lbs without c/o increase in pain and proper form demonstrated        Progression Towards Functional goals:  [] Patient is progressing as expected towards functional goals listed. [] Progression is slowed due to complexities listed.   [] Progression has been slowed due to co-morbidities. [x] Plan just implemented, too soon to assess goals progression  [] Other:     ASSESSMENT:  See eval    Treatment/Activity Tolerance:  [] Patient tolerated treatment well [] Patient limited by fatique  [x] Patient limited by pain  [] Patient limited by other medical complications  [] Other:   6/12: Required cues to not push through pain and limit motion with HEP especially with shoulder shrugs. Experienced relief at L side of neck with UT stretch    Patient education:  6/12: Educated on precautions, proper fit/ use of sling, use of ice and importance of HEP. Prognosis: [x] Good [] Fair  [] Poor    Patient Requires Follow-up: [x] Yes  [] No    PLAN: Continues with physical therapy 1-2x a week for 6 weeks per MD protocol. [] Continue per plan of care [] Alter current plan (see comments)  [x] Plan of care initiated [] Hold pending MD visit [] Discharge    Electronically signed by: Noemy Ferguson PT, DPT  *If patient does not return for further follow ups after this date. Please consider this as the patients discharge from physical therapy.

## 2019-06-12 NOTE — LETTER
Patient Name: Claudette Alcantar MRN: [id number]  DOS: 6/12/2019   Diagnosis:   1. Arthrosis of left acromioclavicular joint    2. Incomplete tear of left rotator cuff, unspecified whether traumatic    3. Impingement syndrome of left shoulder    4. Tear of left glenoid labrum, subsequent encounter                           Surgical Procedure:  Left shoulder arthroscopy with labral debridement,  subacromial decompression, subacromial bursectomy, rotator cuff repair.               Surgical Date: 06/06/2019  Goal:  []Decrease Pain and/or Swelling [x]Increase ROM and/or Flexibility     [x]Increase Function                           [x]Increase Strength and/or Endurance   []Other   Evaluation:  [x]Evaluation and Treatment []KT-1000   []Isokinetic Exam   []Preoperative Eval    Recommended Modalities:  [x]Modalities of Choice      []HCVS            []Electrical Stimulation     [] Remove Dressing  []Ultrasound        []TENS/TNS    [] Lumbar Traction           [] Cervical Traction   []Phonophoresis         []Hot Pack/Cold Pack   []PT Treatment, Unlisted []Other:  Therapeutic Exercises:    [x]Isometrics    [x]Range of Motion []Progressive Exer. []Balance Coordination   []Flexibility  []ROM Limited  []Total Hip Replacement   []Passive  []ROM Full   []Total Knee Replacement  []Active Assisted    []Shoulder Impingement Prog  []Active   []Tennis Elbow Program   []Capsular Shift Regular        []Isokinetics      []Spine Program   []Straight Leg Raises  [] Gait    []Fixation                   [] Supine                                              [] Running   [] Extension   [] Prone   [] Throwing   [] Stabilization   [] AB    [] [place]   [] AD      [] Spine Eval   [] Cervical Eval  [] Conditioning   [] Lumbar  [] Stationary Bike   [] [place] Track   [] Lumbar Exer.  [] Stairmaster         [] Treadmill   [] Functional Cap [] Aquatic Prog.      [] Return to work    Treatment Program: Frequency: [] 1x  [] 2x  [] 3x  [] 4x  [] 5x week/month  Duration: [] 1  [] 2  [] 3  [] 4  [] 5 week/month  Weight Bearing: [] Non  [] 1/4  [] 1/2  [] 3/4  [] Full  ROM: [] Restricted  [] Full  [] Follow established:        [] Other:

## 2019-06-12 NOTE — PLAN OF CARE
not already being addressed at this time. Co-morbidities/Complexities (which will affect course of rehabilitation):   []None              Arthritic conditions   []Rheumatoid arthritis (M05.9)  []Osteoarthritis (M19.91)    Cardiovascular conditions   [x]Hypertension (I10)  []Hyperlipidemia (E78.5)  []Angina pectoris (I20)  []Atherosclerosis (I70)    Musculoskeletal conditions   []Disc pathology   []Congenital spine pathologies   []Prior surgical intervention  []Osteoporosis (M81.8)  []Osteopenia (M85.8)   Endocrine conditions   []Hypothyroid (E03.9)  []Hyperthyroid Gastrointestinal conditions   []Constipation (I30.75)    Metabolic conditions   []Morbid obesity (E66.01)  [x]Diabetes type 1(E10.65) or 2 (E11.65)   []Neuropathy (G60.9)      Pulmonary conditions   []Asthma (J45)  []Coughing   []COPD (J44.9)    Psychological Disorders  []Anxiety (F41.9)  []Depression (F32.9)   []Other:    [x]Other:     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   [] Falls Risk assessed and Patient requires intervention due to being higher risk   TUG score (>12s at risk):     [] Falls education provided, including      G-Codes: UEFI 3/80 indicating 96% functional limitation           ASSESSMENT:   Functional Impairments              []Noted spinal or UE joint hypomobility              []Noted spinal or UE joint hypermobility              [x]Decreased UE functional ROM              [x]Decreased UE functional strength              [x]Abnormal reflexes/sensation/myotomal/dermatomal deficits              [x]Decreased RC/scapular/core strength and neuromuscular control              []other:       Functional Activity Limitations (from functional questionnaire and intake)              [x]Reduced ability to tolerate prolonged functional positions              [x]Reduced ability or difficulty with changes of positions or transfers between positions              [x]Reduced ability to maintain good posture and demonstrate good body mechanics with sitting, bending, and lifting              [x] Reduced ability or tolerance with driving and/or computer work              [x]Reduced ability to sleep              [x]Reduced ability to perform lifting, reaching, carrying tasks              [x]Reduced ability to tolerate impact through UE              [x]Reduced ability to reach behind back              [x]Reduced ability to  or hold objects              [x]Reduced ability to throw or toss an object              []other:       Participation Restrictions              [x]Reduced participation in self care activities              [x]Reduced participation in home management activities              [x]Reduced participation in work activities              [x]Reduced participation in social activities. [x]Reduced participation in sport / recreational activities. Classification:              [x]Signs/symptoms consistent with post-surgical status including decreased ROM, strength and function.   []Signs/symptoms consistent with joint sprain/strain              []Signs/symptoms consistent with shoulder impingement              []Signs/symptoms consistent with shoulder/elbow/wrist tendinopathy              []Signs/symptoms consistent with Rotator cuff tear              []Signs/symptoms consistent with labral tear              []Signs/symptoms consistent with postural dysfunction                         []Signs/symptoms consistent with Glenohumeral IR Deficit - <45 degrees              []Signs/symptoms consistent with facet dysfunction of cervical/thoracic spine                         []Signs/symptoms consistent with pathology which may benefit from Dry needling                []other:      Prognosis/Rehab Potential:                                       []Excellent              [x]Good                 []Fair              []Poor     Tolerance of evaluation/treatment:               []Excellent              [x]Good                 []Fair              []Poor     Physical Therapy Evaluation Complexity Justification  [x] A history of present problem with:  [] no personal factors and/or comorbidities that impact the plan of care;  []1-2 personal factors and/or comorbidities that impact the plan of care  [x]3 personal factors and/or comorbidities that impact the plan of care  [x] An examination of body systems using standardized tests and measures addressing any of the following: body structures and functions (impairments), activity limitations, and/or participation restrictions;:  [x] a total of 1-2 or more elements   [] a total of 3 or more elements   [] a total of 4 or more elements   [x] A clinical presentation with:  [x] stable and/or uncomplicated characteristics   [] evolving clinical presentation with changing characteristics  [] unstable and unpredictable characteristics;   [x] Clinical decision making of [x] low, [] moderate, [] high complexity using standardized patient assessment instrument and/or measurable assessment of functional outcome.      [x] EVAL (LOW) 46692 (typically 20 minutes face-to-face)  [] EVAL (MOD) 87154 (typically 30 minutes face-to-face)  [] EVAL (HIGH) 45538 (typically 45 minutes face-to-face)  [] RE-EVAL         PLAN:  Frequency/Duration:  1-2 days per week for 6 Weeks:  INTERVENTIONS:  [x] Therapeutic exercise including: strength training, ROM, for Upper extremity and core   [x]  NMR activation and proprioception for UE, scap and Core   [x] Manual therapy as indicated for shoulder, scapula and spine to include: Dry Needling/IASTM, STM, PROM, Gr I-IV mobilizations, manipulation. [x] Modalities as needed that may include: thermal agents, E-stim, Biofeedback, US, iontophoresis as indicated  [x] Patient education on joint protection, postural re-education, activity modification, progression of HEP. GOALS:  Patient stated goal: Decrease pain and improve movement     Therapist goals for Patient:   Short Term Goals: To be achieved in: 2 weeks  1. Independent in HEP and progression per patient tolerance, in order to prevent re-injury. 2. Patient will have a decrease in pain to facilitate improvement in movement, function, and ADLs as indicated by Functional Deficits. Long Term Goals: To be achieved in: 12 weeks  1. Disability index score of 20% or less for the UEFS to assist with reaching prior level of function. 2. Patient will demonstrate increased AROM that is equal to R to allow for proper joint functioning as indicated by patients Functional Deficits. 3. Patient will demonstrate an increase in Strength to good scapular and core control  for UE to allow for proper functional mobility as indicated by patients Functional Deficits. 4. Patient will return to all functional activities without increased symptoms or restriction.    5. Patient will be able to lift up to 5 lbs without c/o increase in pain and proper form demonstrated       Electronically signed by:  Barbie Hopkins PT

## 2019-06-12 NOTE — PROGRESS NOTES
History of Present Illness:  Courtney Cottrell is a 72 y.o. female 1 week status post left shoulder arthroscopy with rotator cuff repair on 6/6/2019. She is doing well. Her pain levels are reasonable. She has no concerns today. Medical History:  Patient's medications, allergies, past medical, surgical, social and family histories were reviewed and updated as appropriate. Pertinent items are noted in HPI  Review of systems reviewed from Patient History Form completed today and available in the patient's chart under the Media tab. Vital Signs:  Ht 5' 4\" (1.626 m)   Wt 178 lb (80.7 kg)   BMI 30.55 kg/m²         Neuro: Alert & oriented x 3,  normal,  no focal deficits noted. Normal affect. Eyes: sclera clear  Ears: Normal external ear  Mouth:  No perioral lesions  Pulm: Respirations unlabored and regular  Pulse: Extremities well perfused. 2+ peripheral pulses. Skin: Warm. No ulcerations. Constitutional: The physical examination finds the patient to be well-developed and well-nourished. The patient is alert and oriented x3 and was cooperative throughout the visit. LEFT Shoulder Examination:    Inspection: Sling on Portals healing well. No indication of infection. No drainage. No diffuse erythema. Benign without gross deformity    Palpation: Well tolerated gentle circumduction. Nontender to light touch    Range of Motion: Deferred    Strength:  Deferred    Stability: Deferred    Special Tests:  Distally neurovascularly intact        RIGHT comparison shoulder exam    Inspection: Incisions well healed. No indication of infection. Held in a normal posture. Normal contour at the acromioclavicular joint. No swelling, ecchymosis, or erythema about the shoulder. No atrophy appreciated. No scapular winging. Palpation:  No subacromial crepitus. No tenderness of the AC joint. No greater tuberosity tenderness. No tenderness in the bicipital groove.     Range of Motion: Full passive and active ROM. Normal scapulothoracic rhythm. Strength:  Normal supraspinatus, infraspinatus, and subscapularis muscle strength. Stability: No anterior instability. No posterior instability. Other findings: The skin is warm dry and well perfused. 2+ radial pulse. Sensation is intact to light touch over the deltoid. Radiology:      No new XR obtained today    Assessment :  72 y.o. female 1 week status post left shoulder arthroscopy with rotator cuff repair on 6/6/2019. Impression:  Encounter Diagnoses   Name Primary?  Arthrosis of left acromioclavicular joint Yes    Incomplete tear of left rotator cuff, unspecified whether traumatic        Office Procedures:  No orders of the defined types were placed in this encounter. Plan: Continue postoperative physical therapy. Continue wearing sling. Followup with us in 4 weeks or sooner if needed. Cheryl Narvaez is in agreement with this plan. All questions were answered to patient's satisfaction and was encouraged to call with any further questions. Carley Fleming PA-C  6/12/2019     During this examination, I, Carley Fleming PA-C, functioned as a scribe for Dr. Allison Matthews. The history taking and physical examination were performed by Dr. Allison Matthews. All counseling during the appointment was performed between the patient and Dr. Allison Matthews. 6/12/2019 3:31 PM       This dictation was performed with a verbal recognition program (DRAGON) and it was checked for errors. It is possible that there are still dictated errors within this office note. If so, please bring any errors to my attention for an addendum. All efforts were made to ensure that this office note is accurate. I personally reviewed the patient's pain scale, review of systems, family history, social history, past medical history, allergies and medications. Review of systems was collected today, reviewed and is included in the medical record.   It is available under the

## 2019-06-17 ENCOUNTER — HOSPITAL ENCOUNTER (OUTPATIENT)
Dept: PHYSICAL THERAPY | Age: 74
Setting detail: THERAPIES SERIES
Discharge: HOME | End: 2019-06-17
Payer: COMMERCIAL

## 2019-06-17 PROCEDURE — 97140 MANUAL THERAPY 1/> REGIONS: CPT

## 2019-06-17 PROCEDURE — 97110 THERAPEUTIC EXERCISES: CPT

## 2019-06-17 NOTE — FLOWSHEET NOTE
ea L 6/17        Isometrics     Retraction 3x10 standing 6/17        Weight shift     Flexion     Abduction     External Rotation     Internal Rotation     Biceps     Triceps          PRE's     Flexion     Abduction     External Rotation     Internal Rotation     Shrugs     EXT     Reverse Flys     Serratus     Horizontal Abd with ER     Biceps     Triceps     Retraction          Cable Column/Theraband     External Rotation     Internal Rotation     Shrugs     Lats     Ext     Flex     Scapular Retraction     BIC     TRIC     PNF          Dynamic Stability          Plyoback          Manual interventions     PROM L shoulder flexion, abduction, ER/ IR @ 30 deg abd 12' 6/17              Therapeutic Exercise and NMR EXR  [x] (84459) Provided verbal/tactile cueing for activities related to strengthening, flexibility, endurance, ROM  for improvements in scapular, scapulothoracic and UE control with self care, reaching, carrying, lifting, house/yardwork, driving/computer work.    [] (89423) Provided verbal/tactile cueing for activities related to improving balance, coordination, kinesthetic sense, posture, motor skill, proprioception  to assist with  scapular, scapulothoracic and UE control with self care, reaching, carrying, lifting, house/yardwork, driving/computer work. Therapeutic Activities:    [x] (24584 or 33059) Provided verbal/tactile cueing for activities related to improving balance, coordination, kinesthetic sense, posture, motor skill, proprioception and motor activation to allow for proper function of scapular, scapulothoracic and UE control with self care, carrying, lifting, driving/computer work. 6/12: Reviewed proper posture, don/doff sling and proper fit of sling.      Home Exercise Program:    [x] (32126) Reviewed/Progressed HEP activities related to strengthening, flexibility, endurance, ROM of scapular, scapulothoracic and UE control with self care, reaching, carrying, lifting, house/yardwork, driving/computer work  [] (21532) Reviewed/Progressed HEP activities related to improving balance, coordination, kinesthetic sense, posture, motor skill, proprioception of scapular, scapulothoracic and UE control with self care, reaching, carrying, lifting, house/yardwork, driving/computer work      Manual Treatments:  PROM / STM / Oscillations-Mobs:  G-I, II, III, IV (PA's, Inf., Post.)  [x] (17520) Provided manual therapy to mobilize soft tissue/joints of cervical/CT, scapular GHJ and UE for the purpose of modulating pain, promoting relaxation,  increasing ROM, reducing/eliminating soft tissue swelling/inflammation/restriction, improving soft tissue extensibility and allowing for proper ROM for normal function with self care, reaching, carrying, lifting, house/yardwork, driving/computer work    Modalities: CP to go 6/17     Charges:  Timed Code Treatment Minutes: 44'   Total Treatment Minutes: 2:18-3:00  42'       [] EVAL (LOW) 73635 (typically 20 minutes face-to-face)  [] EVAL (MOD) 23926 (typically 30 minutes face-to-face)  [] EVAL (HIGH) 36557 (typically 45 minutes face-to-face)  [] RE-EVAL     [x] GV(34174) x  2   [] IONTO  [] NMR (49515) x      [] VASO  [x] Manual (30397) x  1    [] Other:  [] TA x       [] Mech Traction (38598)  [] ES(attended) (57665)      [] ES (un) (70082):     GOALS:  Patient stated goal: Decrease pain and improve movement      Therapist goals for Patient:   Short Term Goals: To be achieved in: 2 weeks  1. Independent in HEP and progression per patient tolerance, in order to prevent re-injury. 2. Patient will have a decrease in pain to facilitate improvement in movement, function, and ADLs as indicated by Functional Deficits.     Long Term Goals: To be achieved in: 12 weeks  1. Disability index score of 20% or less for the UEFS to assist with reaching prior level of function.    2. Patient will demonstrate increased AROM that is equal to R to allow for proper joint functioning as indicated by patients Functional Deficits. 3. Patient will demonstrate an increase in Strength to good scapular and core control  for UE to allow for proper functional mobility as indicated by patients Functional Deficits. 4. Patient will return to all functional activities without increased symptoms or restriction. 5. Patient will be able to lift up to 5 lbs without c/o increase in pain and proper form demonstrated        Progression Towards Functional goals:  [] Patient is progressing as expected towards functional goals listed. [] Progression is slowed due to complexities listed. [] Progression has been slowed due to co-morbidities. [x] Plan just implemented, too soon to assess goals progression  [] Other:     ASSESSMENT:  See eval    Treatment/Activity Tolerance:  [] Patient tolerated treatment well [] Patient limited by fatique  [x] Patient limited by pain  [] Patient limited by other medical complications  [] Other:   6/12: Required cues to not push through pain and limit motion with HEP especially with shoulder shrugs. Experienced relief at L side of neck with UT stretch    Patient education:  6/12: Educated on precautions, proper fit/ use of sling, use of ice and importance of HEP.   6/17: Reviewed proper fit of sling and use of strap around abdomen to improve fit as needed. Prognosis: [x] Good [] Fair  [] Poor    Patient Requires Follow-up: [x] Yes  [] No    PLAN: Continues with physical therapy 1-2x a week for 6 weeks per MD protocol. [] Continue per plan of care [] Alter current plan (see comments)  [x] Plan of care initiated [] Hold pending MD visit [] Discharge    Electronically signed by: Barbie Hopkins PT, DPT  *If patient does not return for further follow ups after this date. Please consider this as the patients discharge from physical therapy.

## 2019-06-20 ENCOUNTER — APPOINTMENT (OUTPATIENT)
Dept: PHYSICAL THERAPY | Age: 74
End: 2019-06-20
Payer: COMMERCIAL

## 2019-07-03 ENCOUNTER — HOSPITAL ENCOUNTER (OUTPATIENT)
Dept: PHYSICAL THERAPY | Age: 74
Setting detail: THERAPIES SERIES
Discharge: HOME | End: 2019-07-03
Payer: MEDICARE

## 2019-07-03 PROCEDURE — 97140 MANUAL THERAPY 1/> REGIONS: CPT | Performed by: PHYSICAL THERAPIST

## 2019-07-03 PROCEDURE — 97110 THERAPEUTIC EXERCISES: CPT | Performed by: PHYSICAL THERAPIST

## 2019-07-03 NOTE — FLOWSHEET NOTE
Therapist goals for Patient:   Short Term Goals: To be achieved in: 2 weeks  1. Independent in HEP and progression per patient tolerance, in order to prevent re-injury. 2. Patient will have a decrease in pain to facilitate improvement in movement, function, and ADLs as indicated by Functional Deficits.     Long Term Goals: To be achieved in: 12 weeks  1. Disability index score of 20% or less for the UEFS to assist with reaching prior level of function. 2. Patient will demonstrate increased AROM that is equal to R to allow for proper joint functioning as indicated by patients Functional Deficits. 3. Patient will demonstrate an increase in Strength to good scapular and core control  for UE to allow for proper functional mobility as indicated by patients Functional Deficits. 4. Patient will return to all functional activities without increased symptoms or restriction. 5. Patient will be able to lift up to 5 lbs without c/o increase in pain and proper form demonstrated        Progression Towards Functional goals:  [] Patient is progressing as expected towards functional goals listed. [] Progression is slowed due to complexities listed. [] Progression has been slowed due to co-morbidities. [x] Plan just implemented, too soon to assess goals progression  [] Other:     ASSESSMENT:      Treatment/Activity Tolerance:  [] Patient tolerated treatment well [] Patient limited by fatique  [x] Patient limited by pain  [] Patient limited by other medical complications  [] Other: 7/3 pt demonstrates very good improvement in PROM in flex/abd/ER, notes tightness and mild discomfort at end range. Noted 3-4/10 achy pain with bicep curls and shrugs. Good tolerance to addition of table slides. Pt requires PT follow up to address ROM, strength and functional mobility deficits.      Patient education:  6/12: Educated on precautions, proper fit/ use of sling, use of ice and importance of HEP.   6/17: Reviewed proper fit of

## 2019-07-05 ENCOUNTER — HOSPITAL ENCOUNTER (OUTPATIENT)
Dept: PHYSICAL THERAPY | Age: 74
Setting detail: THERAPIES SERIES
Discharge: HOME | End: 2019-07-05
Payer: MEDICARE

## 2019-07-05 PROCEDURE — 97110 THERAPEUTIC EXERCISES: CPT

## 2019-07-05 NOTE — FLOWSHEET NOTE
labral debridement, Subacromial decompression and bursectomy 6/6/19    Exercises/Interventions:   Exercises:  Exercise/Equipment Resistance/Repetitions Other comments   Stretching/PROM     UT stretch     Levator stretch ^6/17   Elbow PROM  ^6/17 AROM   Table slides 10x10\" flex/ABD/ER ^7/3   Pendulum  6/17   Seated ER 10x10\" wand, arm at side Start 7/3   AAROM Supine shoulder flexion with UE 5 x 10 sec Start 7/5   Isometrics     Retraction 3x10 standing 6/17        Weight shift     Flexion     Abduction     External Rotation     Internal Rotation     Biceps     Triceps          PRE's     Flexion     Abduction     External Rotation     Internal Rotation     Shrugs 3x10 1# ^7/3   EXT     Reverse Flys     Serratus     Horizontal Abd with ER     Biceps 3x10 1# Start 7/3   Triceps     Retraction          Cable Column/Theraband     External Rotation     Internal Rotation     Shrugs     Lats     Ext     Flex     Scapular Retraction     BIC     TRIC     PNF          Dynamic Stability          Plyoback          Manual interventions                Therapeutic Exercise and NMR EXR  [x] (12660) Provided verbal/tactile cueing for activities related to strengthening, flexibility, endurance, ROM  for improvements in scapular, scapulothoracic and UE control with self care, reaching, carrying, lifting, house/yardwork, driving/computer work.    [] (70666) Provided verbal/tactile cueing for activities related to improving balance, coordination, kinesthetic sense, posture, motor skill, proprioception  to assist with  scapular, scapulothoracic and UE control with self care, reaching, carrying, lifting, house/yardwork, driving/computer work.     Therapeutic Activities:    [x] (76874 or 87117) Provided verbal/tactile cueing for activities related to improving balance, coordination, kinesthetic sense, posture, motor skill, proprioception and motor activation to allow for proper function of scapular, scapulothoracic and UE control with self care, carrying, lifting, driving/computer work. 6/12: Reviewed proper posture, don/doff sling and proper fit of sling. Home Exercise Program:    [x] (52013) Reviewed/Progressed HEP activities related to strengthening, flexibility, endurance, ROM of scapular, scapulothoracic and UE control with self care, reaching, carrying, lifting, house/yardwork, driving/computer work  [] (87075) Reviewed/Progressed HEP activities related to improving balance, coordination, kinesthetic sense, posture, motor skill, proprioception of scapular, scapulothoracic and UE control with self care, reaching, carrying, lifting, house/yardwork, driving/computer work      Manual Treatments:  PROM / STM / Oscillations-Mobs:  G-I, II, III, IV (PA's, Inf., Post.)  [x] (48673) Provided manual therapy to mobilize soft tissue/joints of cervical/CT, scapular GHJ and UE for the purpose of modulating pain, promoting relaxation,  increasing ROM, reducing/eliminating soft tissue swelling/inflammation/restriction, improving soft tissue extensibility and allowing for proper ROM for normal function with self care, reaching, carrying, lifting, house/yardwork, driving/computer work    Modalities: ice to go 7/5    Charges:  Timed Code Treatment Minutes: 25   Total Treatment Minutes: 42   Time in: 7:24  Time out: 8:06    [] EVAL (LOW) 74151 (typically 20 minutes face-to-face)  [] EVAL (MOD) 75160 (typically 30 minutes face-to-face)  [] EVAL (HIGH) 64919 (typically 45 minutes face-to-face)  [] RE-EVAL     [x] ZD(17446) x  2   [] IONTO  [] NMR (09886) x      [] VASO  [] Manual (26978) x       [] Other:  [] TA x       [] Mech Traction (86704)  [] ES(attended) (45049)      [] ES (un) (05672):     GOALS:  Patient stated goal: Decrease pain and improve movement      Therapist goals for Patient:   Short Term Goals: To be achieved in: 2 weeks  1. Independent in HEP and progression per patient tolerance, in order to prevent re-injury.    2. Patient will have a

## 2019-07-09 ENCOUNTER — OFFICE VISIT (OUTPATIENT)
Dept: ORTHOPEDIC SURGERY | Age: 74
End: 2019-07-09

## 2019-07-09 ENCOUNTER — HOSPITAL ENCOUNTER (OUTPATIENT)
Dept: PHYSICAL THERAPY | Age: 74
Setting detail: THERAPIES SERIES
Discharge: HOME | End: 2019-07-09
Payer: MEDICARE

## 2019-07-09 VITALS
BODY MASS INDEX: 30.73 KG/M2 | DIASTOLIC BLOOD PRESSURE: 78 MMHG | WEIGHT: 180 LBS | HEIGHT: 64 IN | HEART RATE: 79 BPM | SYSTOLIC BLOOD PRESSURE: 153 MMHG

## 2019-07-09 DIAGNOSIS — T81.41XA POSTOPERATIVE STITCH ABSCESS: Primary | ICD-10-CM

## 2019-07-09 PROCEDURE — 99024 POSTOP FOLLOW-UP VISIT: CPT | Performed by: ORTHOPAEDIC SURGERY

## 2019-07-09 PROCEDURE — 97110 THERAPEUTIC EXERCISES: CPT

## 2019-07-11 ENCOUNTER — HOSPITAL ENCOUNTER (OUTPATIENT)
Dept: PHYSICAL THERAPY | Age: 74
Setting detail: THERAPIES SERIES
End: 2019-07-11
Payer: MEDICARE

## 2019-07-16 ENCOUNTER — HOSPITAL ENCOUNTER (OUTPATIENT)
Dept: PHYSICAL THERAPY | Age: 74
Setting detail: THERAPIES SERIES
Discharge: HOME | End: 2019-07-16
Payer: MEDICARE

## 2019-07-16 ENCOUNTER — OFFICE VISIT (OUTPATIENT)
Dept: ORTHOPEDIC SURGERY | Age: 74
End: 2019-07-16

## 2019-07-16 VITALS
BODY MASS INDEX: 30.73 KG/M2 | SYSTOLIC BLOOD PRESSURE: 105 MMHG | HEIGHT: 64 IN | DIASTOLIC BLOOD PRESSURE: 69 MMHG | HEART RATE: 79 BPM | WEIGHT: 180 LBS

## 2019-07-16 DIAGNOSIS — T81.41XA POSTOPERATIVE STITCH ABSCESS: Primary | ICD-10-CM

## 2019-07-16 DIAGNOSIS — Z98.890 S/P LEFT ROTATOR CUFF REPAIR: ICD-10-CM

## 2019-07-16 PROCEDURE — 97110 THERAPEUTIC EXERCISES: CPT | Performed by: PHYSICAL THERAPIST

## 2019-07-16 PROCEDURE — 99024 POSTOP FOLLOW-UP VISIT: CPT | Performed by: PHYSICIAN ASSISTANT

## 2019-07-16 PROCEDURE — 97140 MANUAL THERAPY 1/> REGIONS: CPT | Performed by: PHYSICAL THERAPIST

## 2019-07-16 PROCEDURE — 97530 THERAPEUTIC ACTIVITIES: CPT | Performed by: PHYSICAL THERAPIST

## 2019-07-16 NOTE — FLOWSHEET NOTE
Reviewed/Progressed HEP activities related to strengthening, flexibility, endurance, ROM of scapular, scapulothoracic and UE control with self care, reaching, carrying, lifting, house/yardwork, driving/computer work  [x] (72258) Reviewed/Progressed HEP activities related to improving balance, coordination, kinesthetic sense, posture, motor skill, proprioception of scapular, scapulothoracic and UE control with self care, reaching, carrying, lifting, house/yardwork, driving/computer work      Manual Treatments:  PROM / STM / Oscillations-Mobs:  G-I, II, III, IV (PA's, Inf., Post.)  [x] (26489) Provided manual therapy to mobilize soft tissue/joints of cervical/CT, scapular GHJ and UE for the purpose of modulating pain, promoting relaxation,  increasing ROM, reducing/eliminating soft tissue swelling/inflammation/restriction, improving soft tissue extensibility and allowing for proper ROM for normal function with self care, reaching, carrying, lifting, house/yardwork, driving/computer work    Modalities: ice to go    Charges:  Timed Code Treatment Minutes: 47   Total Treatment Minutes: 60       [] EVAL (LOW) 46473 (typically 20 minutes face-to-face)  [] EVAL (MOD) 75852 (typically 30 minutes face-to-face)  [] EVAL (HIGH) 36866 (typically 45 minutes face-to-face)  [] RE-EVAL     [x] DS(43652) x  1   [] IONTO  [] NMR (29605) x      [] VASO  [x] Manual (72913) x  1    [] Other:  [x] TA x  1    [] [facility] Traction (99635)  [] ES(attended) (27467)      [] ES (un) (13950):     GOALS:  Patient stated goal: Decrease pain and improve movement      Therapist goals for Patient:   Short Term Goals: To be achieved in: 2 weeks  1. Independent in HEP and progression per patient tolerance, in order to prevent re-injury. 2. Patient will have a decrease in pain to facilitate improvement in movement, function, and ADLs as indicated by Functional Deficits.     Long Term Goals: To be achieved in: 12 weeks  1.  Disability index score of 20% or

## 2019-07-18 NOTE — PROGRESS NOTES
History of Present Illness:  John De Leon is a 72 y.o. female here today 1 month status post left shoulder rotator cuff repair on 6/6/2018 and for a wound check. She was last evaluated by us on 7/9/2019 at which time she had been experiencing increased redness associated with the surgical wounds and had very scant amount of exudate emanating from the posterior wound. She was provided an oral and topical antibiotic by her primary care provider who also advised to keep her wounds clean and dry. Comes in today reporting that she is pleased with the appearance of her wounds. She still has 2-3 more days left of her oral antibiotic prescribed by her primary care. She continues to deny any fever, chills, or other signs of systemic infection. She denies any tobacco use. She is a type II diabetic, but reports this is well controlled. No new injuries reported. Medical History:  Patient's medications, allergies, past medical, surgical, social and family histories were reviewed and updated as appropriate. Pertinent items are noted in HPI  Review of systems reviewed from Patient History Form completed today and available in the patient's chart under the Media tab. Vital Signs:  /69   Pulse 79   Ht 5' 4\" (1.626 m)   Wt 180 lb (81.6 kg)   BMI 30.90 kg/m²         Neuro: Alert & oriented x 3,  normal,  no focal deficits noted. Normal affect. Eyes: sclera clear  Ears: Normal external ear  Mouth:  No perioral lesions  Pulm: Respirations unlabored and regular  Pulse: Extremities well perfused. 2+ peripheral pulses. Skin: Warm. No ulcerations. Constitutional: The physical examination finds the patient to be well-developed and well-nourished. The patient is alert and oriented x3 and was cooperative throughout the visit. LEFT Shoulder Examination:    Inspection: Claudia Ashish appear to be healing routinely with very mild appearingly normal halo of erythema. Portals are healing well.   No

## 2019-07-19 ENCOUNTER — HOSPITAL ENCOUNTER (OUTPATIENT)
Dept: PHYSICAL THERAPY | Age: 74
Setting detail: THERAPIES SERIES
Discharge: HOME | End: 2019-07-19
Payer: MEDICARE

## 2019-07-19 PROCEDURE — 97110 THERAPEUTIC EXERCISES: CPT

## 2019-07-19 PROCEDURE — 97530 THERAPEUTIC ACTIVITIES: CPT

## 2019-07-19 PROCEDURE — 97140 MANUAL THERAPY 1/> REGIONS: CPT

## 2019-07-19 NOTE — FLOWSHEET NOTE
[facility] [address] [place] [address] [facility]       Physical Therapy Daily Treatment Note  Date:  2019    Patient Name:  Varun Sebastian  \"Eri\"  :  1945  MRN: [id number]  Restrictions/Precautions:    Medical/Treatment Diagnosis Information:  · Diagnosis: M75.102 L Rotator cuff tear  · S/p L Left shoulder arthroscopy with labral debridement, subacromial decompression, subacromial bursectomy, rotator cuff repair DOS 19  · Treatment Diagnosis: M25.512 L shoulder pain, M25.612 L shoulder stiffness  Insurance/Certification information:  [facility]   Physician Information:  Referring Practitioner: Hina Bey MD  Plan of care signed (Y/N):     Date of Patient follow up with Physician:  19    G-Code (if applicable): UEFI 96% Date G-Code Applied:  19     Progress Note: []  Yes  []  No  Next due by: 19     Latex Allergy:  [x]NO      []YES  Preferred Language for Healthcare:   [x]English       []other:    Visit # Insurance Allowable   8 [employer]  No limit     Pain level:  Occasional achiness    SUBJECTIVE:  (6 weeks P.O.)  Patient states that she is doing \"alright\". She states that she saw PA last week to check the incisions. She states that she feels a little tight today but better. OBJECTIVE:      ROM PROM AROM  Comment     L R L R     Flexion 125 degrees     165 deg  supine   Abduction 115 degrees      175 deg  supine   ER 56 degrees 90/90          IR  54 degrees at rest         Other  (cervical)             Other                Wound/Incision:  7/3/19 +redness around all 3 incisions, pt denies fever/TTP/general malaise. Posterior incision had a piece of adhesive still in the center of the portal, removed in clinic and incision was cleaned and steri strip placed over it.     RESTRICTIONS/PRECAUTIONS: HTN, Type 2 diabetes,   L RTC repair, labral debridement, Subacromial decompression and bursectomy 6/6/19    Exercises/Interventions:   Exercises:  Exercise/Equipment Resistance/Repetitions Other comments   Stretching/PROM     UT stretch     Levator stretch    Aaliyah  Cane  6 weeks NPV   Table slides 10x10\" flex/ABD    IR stretch 6 weeks NPV   Seated ER 10x10\" wand, arm at side  10 x 10\"  Supine 90/90    AAROM Supine shoulder flexion with UE     Isometrics     Retraction 3x10 standing         Weight shift     Flexion     Abduction     External Rotation     Internal Rotation     Biceps     Triceps          PRE's     Flexion     Abduction     External Rotation     Internal Rotation     Shrugs 3x10 1#    EXT     Reverse Flys     Serratus     Horizontal Abd with ER     Biceps 3x10 2#    Triceps     Retraction          Cable Column/Theraband     External Rotation     Internal Rotation     Shrugs     Lats     Ext     Flex     Scapular Retraction Red 1x10 painful   BIC     TRIC Blue 3x10    PNF          Dynamic Stability          Plyoback          Manual interventions     PROM/manual stretching FL/ABD/ER/IR               Therapeutic Exercise and NMR EXR  [x] (66913) Provided verbal/tactile cueing for activities related to strengthening, flexibility, endurance, ROM  for improvements in scapular, scapulothoracic and UE control with self care, reaching, carrying, lifting, house/yardwork, driving/computer work.    [] (01427) Provided verbal/tactile cueing for activities related to improving balance, coordination, kinesthetic sense, posture, motor skill, proprioception  to assist with  scapular, scapulothoracic and UE control with self care, reaching, carrying, lifting, house/yardwork, driving/computer work.     Therapeutic Activities:    [x] (83542 or 42669) Provided verbal/tactile cueing for activities related to improving balance, coordination, kinesthetic sense, posture, motor skill, proprioception and motor activation to allow for proper function of scapular, scapulothoracic and UE control with self care, carrying, 6 weeks per MD protocol. [x] Continue per plan of care [] Alter current plan (see comments)  [] Plan of care initiated [] Hold pending MD visit [] Discharge    Electronically signed by: Darin Cortes PT, DPT       *If patient does not return for further follow ups after this date. Please consider this as the patients discharge from physical therapy.

## 2019-07-23 ENCOUNTER — HOSPITAL ENCOUNTER (OUTPATIENT)
Dept: PHYSICAL THERAPY | Age: 74
Setting detail: THERAPIES SERIES
Discharge: HOME | End: 2019-07-23
Payer: MEDICARE

## 2019-07-23 ENCOUNTER — OFFICE VISIT (OUTPATIENT)
Dept: ORTHOPEDIC SURGERY | Age: 74
End: 2019-07-23

## 2019-07-23 VITALS — HEIGHT: 64 IN | WEIGHT: 180 LBS | BODY MASS INDEX: 30.73 KG/M2

## 2019-07-23 DIAGNOSIS — Z98.890 S/P LEFT ROTATOR CUFF REPAIR: Primary | ICD-10-CM

## 2019-07-23 PROCEDURE — 97110 THERAPEUTIC EXERCISES: CPT | Performed by: PHYSICAL THERAPIST

## 2019-07-23 PROCEDURE — 97530 THERAPEUTIC ACTIVITIES: CPT | Performed by: PHYSICAL THERAPIST

## 2019-07-23 PROCEDURE — 99024 POSTOP FOLLOW-UP VISIT: CPT | Performed by: ORTHOPAEDIC SURGERY

## 2019-07-23 NOTE — FLOWSHEET NOTE
UT stretch     Levator stretch    Pulley  Cane  Flexion [id number]    Table slides 10x10\" flex/ABD    IR stretch Stretch strap [id number]    ER stretch 10 x 10\"  Supine 90/90    AAROM Supine shoulder flexion with UE     Isometrics     Retraction          Weight shift     Flexion     Abduction     External Rotation     Internal Rotation     Biceps     Triceps          PRE's     Flexion     Abduction     External Rotation 0# 3x10 AROM    Internal Rotation     Shrugs     EXT     Reverse Flys     Serratus 0# 3x10    Horizontal Abd with ER     Biceps 3x10 3#    Triceps     Retraction          Cable Column/Theraband     External Rotation     Internal Rotation     Shrugs     Lats     Ext     Flex     Scapular Retraction Green 3x10    BIC     TRIC     PNF          Dynamic Stability          Plyoback          Manual interventions     PROM/manual stretching                Therapeutic Exercise and NMR EXR  [x] (79586) Provided verbal/tactile cueing for activities related to strengthening, flexibility, endurance, ROM  for improvements in scapular, scapulothoracic and UE control with self care, reaching, carrying, lifting, house/yardwork, driving/computer work.    [] (65481) Provided verbal/tactile cueing for activities related to improving balance, coordination, kinesthetic sense, posture, motor skill, proprioception  to assist with  scapular, scapulothoracic and UE control with self care, reaching, carrying, lifting, house/yardwork, driving/computer work. Therapeutic Activities:    [x] (16543 or 40888) Provided verbal/tactile cueing for activities related to improving balance, coordination, kinesthetic sense, posture, motor skill, proprioception and motor activation to allow for proper function of scapular, scapulothoracic and UE control with self care, carrying, lifting, driving/computer work. 6/12: Reviewed proper posture, don/doff sling and proper fit of sling.      Home Exercise Program:    [x] (98607) Reviewed/Progressed HEP activities related to strengthening, flexibility, endurance, ROM of scapular, scapulothoracic and UE control with self care, reaching, carrying, lifting, house/yardwork, driving/computer work  [x] (05579) Reviewed/Progressed HEP activities related to improving balance, coordination, kinesthetic sense, posture, motor skill, proprioception of scapular, scapulothoracic and UE control with self care, reaching, carrying, lifting, house/yardwork, driving/computer work      Manual Treatments:  PROM / STM / Oscillations-Mobs:  G-I, II, III, IV (PA's, Inf., Post.)  [] (45929) Provided manual therapy to mobilize soft tissue/joints of cervical/CT, scapular GHJ and UE for the purpose of modulating pain, promoting relaxation,  increasing ROM, reducing/eliminating soft tissue swelling/inflammation/restriction, improving soft tissue extensibility and allowing for proper ROM for normal function with self care, reaching, carrying, lifting, house/yardwork, driving/computer work    Modalities: ice to go     Charges:  Timed Code Treatment Minutes: 35   Total Treatment Minutes: 40       [] EVAL (LOW) 77441 (typically 20 minutes face-to-face)  [] EVAL (MOD) 53288 (typically 30 minutes face-to-face)  [] EVAL (HIGH) 18469 (typically 45 minutes face-to-face)  [] RE-EVAL     [x] ZS(25027) x  1   [] IONTO  [] NMR (40537) x      [] VASO  [] Manual (27433) x       [] Other:  [x] TA x  1    [] Mech Traction (40288)  [] ES(attended) (65994)      [] ES (un) (69498):     GOALS:  Patient stated goal: Decrease pain and improve movement      Therapist goals for Patient:   Short Term Goals: To be achieved in: 2 weeks  1. Independent in HEP and progression per patient tolerance, in order to prevent re-injury. 2. Patient will have a decrease in pain to facilitate improvement in movement, function, and ADLs as indicated by Functional Deficits.     Long Term Goals: To be achieved in: 12 weeks  1.  Disability index score of 20% or

## 2019-07-25 ENCOUNTER — HOSPITAL ENCOUNTER (OUTPATIENT)
Dept: PHYSICAL THERAPY | Age: 74
Setting detail: THERAPIES SERIES
Discharge: HOME | End: 2019-07-25
Payer: MEDICARE

## 2019-07-25 PROCEDURE — 97530 THERAPEUTIC ACTIVITIES: CPT | Performed by: PHYSICAL THERAPIST

## 2019-07-25 PROCEDURE — 97110 THERAPEUTIC EXERCISES: CPT | Performed by: PHYSICAL THERAPIST

## 2019-07-25 NOTE — FLOWSHEET NOTE
in: 12 weeks  1. Disability index score of 20% or less for the UEFS to assist with reaching prior level of function. 2. Patient will demonstrate increased AROM that is equal to R to allow for proper joint functioning as indicated by patients Functional Deficits. 3. Patient will demonstrate an increase in Strength to good scapular and core control  for UE to allow for proper functional mobility as indicated by patients Functional Deficits. 4. Patient will return to all functional activities without increased symptoms or restriction. 5. Patient will be able to lift up to 5 lbs without c/o increase in pain and proper form demonstrated        Progression Towards Functional goals:  [x] Patient is progressing as expected towards functional goals listed. [] Progression is slowed due to complexities listed. [] Progression has been slowed due to co-morbidities. [] Plan just implemented, too soon to assess goals progression  [] Other:     ASSESSMENT:  Pt tolerated session well and without pain. Required occasional VC for form correction. Continue per POC of primary PT. Treatment/Activity Tolerance:  [x] Patient tolerated treatment well [] Patient limited by fatique  [] Patient limited by pain  [] Patient limited by other medical complications  [] Other:     Patient education:  Updated HEP 7-23-19    Prognosis: [x] Good [] Fair  [] Poor    Patient Requires Follow-up: [x] Yes  [] No    PLAN: Continue with physical therapy 1-2x a week for 6 weeks per MD protocol. [x] Continue per plan of care [] Alter current plan (see comments)  [] Plan of care initiated [] Hold pending MD visit [] Discharge    Electronically signed by: Mirella Dugan PT, DPT  Physical Therapist  [email or web address]  [email or web address]        *If patient does not return for further follow ups after this date. Please consider this as the patients discharge from physical therapy.

## 2019-07-30 ENCOUNTER — HOSPITAL ENCOUNTER (OUTPATIENT)
Dept: PHYSICAL THERAPY | Age: 74
Setting detail: THERAPIES SERIES
Discharge: HOME | End: 2019-07-30
Payer: MEDICARE

## 2019-07-30 ENCOUNTER — OFFICE VISIT (OUTPATIENT)
Dept: ORTHOPEDIC SURGERY | Age: 74
End: 2019-07-30

## 2019-07-30 VITALS
HEIGHT: 64 IN | HEART RATE: 85 BPM | SYSTOLIC BLOOD PRESSURE: 137 MMHG | DIASTOLIC BLOOD PRESSURE: 73 MMHG | BODY MASS INDEX: 30.73 KG/M2 | WEIGHT: 180 LBS

## 2019-07-30 DIAGNOSIS — Z98.890 S/P ARTHROSCOPY OF LEFT SHOULDER: Primary | ICD-10-CM

## 2019-07-30 PROCEDURE — 97530 THERAPEUTIC ACTIVITIES: CPT

## 2019-07-30 PROCEDURE — 99024 POSTOP FOLLOW-UP VISIT: CPT | Performed by: ORTHOPAEDIC SURGERY

## 2019-07-30 PROCEDURE — 97110 THERAPEUTIC EXERCISES: CPT

## 2019-07-30 NOTE — FLOWSHEET NOTE
function, and ADLs as indicated by Functional Deficits.     Long Term Goals: To be achieved in: 12 weeks  1. Disability index score of 20% or less for the UEFS to assist with reaching prior level of function. 2. Patient will demonstrate increased AROM that is equal to R to allow for proper joint functioning as indicated by patients Functional Deficits. 3. Patient will demonstrate an increase in Strength to good scapular and core control  for UE to allow for proper functional mobility as indicated by patients Functional Deficits. 4. Patient will return to all functional activities without increased symptoms or restriction. 5. Patient will be able to lift up to 5 lbs without c/o increase in pain and proper form demonstrated        Progression Towards Functional goals:  [x] Patient is progressing as expected towards functional goals listed. [] Progression is slowed due to complexities listed. [] Progression has been slowed due to co-morbidities. [] Plan just implemented, too soon to assess goals progression  [] Other:     ASSESSMENT:  Pt demonstrates increased redness around posterior portal with yellow drainage present. Contacted MD office and patient will be seeing MD about possible infection. Tolerated program well today. Treatment/Activity Tolerance:  [x] Patient tolerated treatment well [] Patient limited by fatique  [] Patient limited by pain  [] Patient limited by other medical complications  [] Other:     Patient education:  Updated HEP 7-23-19    Prognosis: [x] Good [] Fair  [] Poor    Patient Requires Follow-up: [x] Yes  [] No    PLAN: Continue with physical therapy 1-2x a week for 6 weeks per MD protocol. [x] Continue per plan of care [] Alter current plan (see comments)  [] Plan of care initiated [] Hold pending MD visit [] Discharge    Electronically signed by: Lindsey Rodriguez PT, DPT          *If patient does not return for further follow ups after this date.  Please consider this as the

## 2019-08-01 ENCOUNTER — HOSPITAL ENCOUNTER (OUTPATIENT)
Dept: PHYSICAL THERAPY | Age: 74
Setting detail: THERAPIES SERIES
Discharge: HOME | End: 2019-08-01
Payer: MEDICARE

## 2019-08-01 PROCEDURE — 97110 THERAPEUTIC EXERCISES: CPT

## 2019-08-01 PROCEDURE — 97530 THERAPEUTIC ACTIVITIES: CPT

## 2019-08-01 NOTE — FLOWSHEET NOTE
[facility] [address] [place] [address] [facility]       Physical Therapy Daily Treatment Note  Date:  2019    Patient Name:  Brock Nguyen  \"Eri\"  :  1945  MRN: [id number]  Restrictions/Precautions:    Medical/Treatment Diagnosis Information:  · Diagnosis: M75.102 L Rotator cuff tear  · S/p L Left shoulder arthroscopy with labral debridement, subacromial decompression, subacromial bursectomy, rotator cuff repair DOS 19  · Treatment Diagnosis: M25.512 L shoulder pain, M25.612 L shoulder stiffness  Insurance/Certification information:  PT Insurance Information: [facility] Medicare   Physician Information:  Referring Practitioner: Ivan Baird MD  Plan of care signed (Y/N):     Date of Patient follow up with Physician:  19    G-Code (if applicable): UEFI 96% Date G-Code Applied:  19     Progress Note: []  Yes  []  No  Next due by: 19 NPV    Latex Allergy:  [x]NO      []YES  Preferred Language for Healthcare:   [x]English       []other:    Visit # Insurance Allowable   11 [employer]  No limit     Pain level:      SUBJECTIVE:  (8 weeks P.O.)  Patient states that she saw MD who removed stitches in her back portal. She states that they feel better. OBJECTIVE:      ROM PROM AROM  Comment     L R L R     Flexion 125 degrees     165 deg  supine   Abduction 115 degrees      175 deg  supine   ER 56 degrees 90/90          IR  54 degrees at rest         Other  (cervical)             Other                Wound/Incision:  7/3/19 +redness around all 3 incisions, pt denies fever/TTP/general malaise. Posterior incision had a piece of adhesive still in the center of the portal, removed in clinic and incision was cleaned and steri strip placed over it.     RESTRICTIONS/PRECAUTIONS: HTN, Type 2 diabetes,   L RTC repair, labral debridement, Subacromial decompression and bursectomy 19    Exercises/Interventions:   Exercises:  Exercise/Equipment Resistance/Repetitions Home Exercise Program:    [x] (25801) Reviewed/Progressed HEP activities related to strengthening, flexibility, endurance, ROM of scapular, scapulothoracic and UE control with self care, reaching, carrying, lifting, house/yardwork, driving/computer work  [x] (36040) Reviewed/Progressed HEP activities related to improving balance, coordination, kinesthetic sense, posture, motor skill, proprioception of scapular, scapulothoracic and UE control with self care, reaching, carrying, lifting, house/yardwork, driving/computer work      Manual Treatments:  PROM / STM / Oscillations-Mobs:  G-I, II, III, IV (PA's, Inf., Post.)  [] (54930) Provided manual therapy to mobilize soft tissue/joints of cervical/CT, scapular GHJ and UE for the purpose of modulating pain, promoting relaxation,  increasing ROM, reducing/eliminating soft tissue swelling/inflammation/restriction, improving soft tissue extensibility and allowing for proper ROM for normal function with self care, reaching, carrying, lifting, house/yardwork, driving/computer work    Modalities: ice to go     Charges:  Timed Code Treatment Minutes: 40   Total Treatment Minutes: 50       [] EVAL (LOW) 69595 (typically 20 minutes face-to-face)  [] EVAL (MOD) 54072 (typically 30 minutes face-to-face)  [] EVAL (HIGH) 94082 (typically 45 minutes face-to-face)  [] RE-EVAL     [x] AN(22753) x  2   [] IONTO  [] NMR (57544) x      [] VASO  [] Manual (31085) x       [] Other:  [x] TA x  1    [] Mech Traction (87562)  [] ES(attended) (36214)      [] ES (un) (40955):     GOALS:  Patient stated goal: Decrease pain and improve movement      Therapist goals for Patient:   Short Term Goals: To be achieved in: 2 weeks  1. Independent in HEP and progression per patient tolerance, in order to prevent re-injury. 2. Patient will have a decrease in pain to facilitate improvement in movement, function, and ADLs as indicated by Functional Deficits.     Long Term Goals:  To be achieved in: 12

## 2019-08-08 ENCOUNTER — HOSPITAL ENCOUNTER (OUTPATIENT)
Dept: PHYSICAL THERAPY | Age: 74
Setting detail: THERAPIES SERIES
Discharge: HOME | End: 2019-08-08
Payer: MEDICARE

## 2019-08-08 PROCEDURE — 97110 THERAPEUTIC EXERCISES: CPT

## 2019-08-08 PROCEDURE — 97530 THERAPEUTIC ACTIVITIES: CPT

## 2019-08-15 ENCOUNTER — HOSPITAL ENCOUNTER (OUTPATIENT)
Dept: PHYSICAL THERAPY | Age: 74
Setting detail: THERAPIES SERIES
Discharge: HOME | End: 2019-08-15
Payer: MEDICARE

## 2019-08-22 ENCOUNTER — HOSPITAL ENCOUNTER (OUTPATIENT)
Dept: PHYSICAL THERAPY | Age: 74
Setting detail: THERAPIES SERIES
Discharge: HOME | End: 2019-08-22
Payer: MEDICARE

## 2019-08-22 PROCEDURE — 97530 THERAPEUTIC ACTIVITIES: CPT

## 2019-08-22 PROCEDURE — 97110 THERAPEUTIC EXERCISES: CPT

## 2019-08-22 NOTE — FLOWSHEET NOTE
The [address] [place] [address] and Sports Rehabilitation [facility]       Physical Therapy Daily Treatment Note  Date:  2019    Patient Name:  Mariela Adame  \"Eri\"  :  1945  MRN: [id number]  Restrictions/Precautions:    Medical/Treatment Diagnosis Information:  · Diagnosis: M75.102 L Rotator cuff tear  · S/p L Left shoulder arthroscopy with labral debridement, subacromial decompression, subacromial bursectomy, rotator cuff repair DOS 19  · Treatment Diagnosis: M25.512 L shoulder pain, M25.612 L shoulder stiffness  Insurance/Certification information:  PT Insurance Information: [employer]   Physician Information:  Referring Practitioner: Sarah Beth Murdock MD  Plan of care signed (Y/N):     Date of Patient follow up with Physician:  19    G-Code (if applicable): UEFI 96% Date G-Code Applied:  19     Progress Note: []  Yes  []  No  Next due by: 19 NPV    Latex Allergy:  [x]NO      []YES  Preferred Language for Healthcare:   [x]English       []other:    Visit # Insurance Allowable   13 [employer]  No limit     Pain level:      SUBJECTIVE:  (11 weeks P.O.) Patient states that her shoulder is sore. OBJECTIVE:      ROM PROM AROM  Comment     L R L R     Flexion 125 degrees     165 deg  supine   Abduction 115 degrees      175 deg  supine   ER 56 degrees 90/90          IR  54 degrees at rest         Other  (cervical)             Other                Wound/Incision:  7/3/19 +redness around all 3 incisions, pt denies fever/TTP/general malaise. Posterior incision had a piece of adhesive still in the center of the portal, removed in clinic and incision was cleaned and steri strip placed over it.     RESTRICTIONS/PRECAUTIONS: HTN, Type 2 diabetes,   L RTC repair, labral debridement, Subacromial decompression and bursectomy 19    Exercises/Interventions:   Exercises:  Exercise/Equipment Resistance/Repetitions Other comments   Stretching/PROM     UT stretch     Levator

## 2019-08-29 ENCOUNTER — HOSPITAL ENCOUNTER (OUTPATIENT)
Dept: PHYSICAL THERAPY | Age: 74
Setting detail: THERAPIES SERIES
Discharge: HOME | End: 2019-08-29
Payer: MEDICARE

## 2019-09-13 ENCOUNTER — OFFICE VISIT (OUTPATIENT)
Dept: ORTHOPEDIC SURGERY | Age: 74
End: 2019-09-13
Payer: COMMERCIAL

## 2019-09-13 VITALS
DIASTOLIC BLOOD PRESSURE: 87 MMHG | SYSTOLIC BLOOD PRESSURE: 110 MMHG | HEART RATE: 80 BPM | BODY MASS INDEX: 30.73 KG/M2 | WEIGHT: 180 LBS | HEIGHT: 64 IN

## 2019-09-13 DIAGNOSIS — Z98.890 S/P LEFT ROTATOR CUFF REPAIR: Primary | ICD-10-CM

## 2019-09-13 PROCEDURE — 99213 OFFICE O/P EST LOW 20 MIN: CPT | Performed by: ORTHOPAEDIC SURGERY

## 2019-09-13 NOTE — PROGRESS NOTES
History of Present Illness:  Perry Fletcher is a 72 y.o. female here today for follow up evaluation of the left shoulder. She is 3 months status post left shoulder rotator cuff repair on 6/6/2019. She has been compliant with her postoperative physical therapy. She states the pain sometimes wakes her up at night but overall she is doing well. No new injuries reported. Medical History:  Patient's medications, allergies, past medical, surgical, social and family histories were reviewed and updated as appropriate. Pertinent items are noted in HPI  Review of systems reviewed from Patient History Form completed today and available in the patient's chart under the Media tab.        Pain Assessment  Location of Pain: Shoulder  Location Modifiers: Left  Severity of Pain: 2  Quality of Pain: Aching  Duration of Pain: A few minutes  Frequency of Pain: Intermittent  Aggravating Factors: (no aggravating factors )  Limiting Behavior: No  Relieving Factors: Rest  Result of Injury: No  Work-Related Injury: No  Are there other pain locations you wish to document?: No    Past Medical History:   Diagnosis Date    Anxiety     Anxiety     CAD (coronary artery disease) 2004    Chronic GERD     Chronic low back pain     Diverticulosis     Foot fracture 09/2016    right     GERD (gastroesophageal reflux disease)     Hearing loss     Hearing loss     High blood pressure     History of blood transfusion     History of MI (myocardial infarction)     HLD (hyperlipidemia)     HTN (hypertension)     IBS (irritable bowel syndrome)     Lumbar radiculopathy     Neuropathy     feet    Spinal stenosis     Type 2 diabetes mellitus (HCC)     UTI (urinary tract infection)     takes cranberry pills        Past Surgical History:   Procedure Laterality Date    ARTHROSCOPY SHOULDER / OPEN SHOULDER Left 6/6/2019    LEFT SHOULDER ARTHROSCOPY, ROTATOR CUFF REPAIR, SUBACROMIAL DECOMPRESSION, LABRAL DEBRIDEMENT performed by Alejandro Kang examination today. Well-developed, well-nourished in no acute distress. Neuro: Alert & oriented x 3,  no focal motor or sensory deficits noted. Eyes: sclera clear, atraumatic  Ears: Normal external ear  Mouth:  No perioral lesions  Pulm: Respirations unlabored and regular  Pulse: Extremities well-perfused. 2+ peripheral pulses   Skin: Warm, no ulcerations        LEFT Shoulder Examination:     Inspection:  Incisions well healed. No indication of infection. No drainage. No diffuse erythema. Benign without gross deformity     Palpation: Well tolerated gentle circumduction. Nontender to light touch     Range of Motion: Active forward elevation to 90 without pain     Strength:  Able to maintain forward elevation     Stability: Deferred     Special Tests:  Distally neurovascularly intact        RIGHT comparison shoulder exam     Inspection:  Held in a normal posture. Normal contour at the acromioclavicular joint. No swelling, ecchymosis, or erythema about the shoulder. No atrophy appreciated. No scapular winging.      Palpation:  No point tenderness.     Range of Motion: Full passive and active ROM. Normal scapulothoracic rhythm.     Strength:  Normal supraspinatus, infraspinatus, and subscapularis muscle strength.     Stability: No gross instability.     Other findings: The skin is warm dry and well perfused. Distally neurovascularly intact. Sensation is intact to light touch over the deltoid. Radiology:     Pertinent imaging reviewed. No new imaging was obtained during today's visit. Assessment :  3 months status post left shoulder rotator cuff repair on 6/6/2019    Impression:  Encounter Diagnosis   Name Primary?  S/P left rotator cuff repair Yes       Office Procedures:  No orders of the defined types were placed in this encounter. Plan: Pertinent imaging was reviewed. The etiology, natural history, and treatment options for the disorder were discussed.   The roles of

## 2019-12-20 ENCOUNTER — OFFICE VISIT (OUTPATIENT)
Dept: ORTHOPEDIC SURGERY | Age: 74
End: 2019-12-20
Payer: COMMERCIAL

## 2019-12-20 VITALS
SYSTOLIC BLOOD PRESSURE: 130 MMHG | WEIGHT: 175 LBS | HEART RATE: 88 BPM | BODY MASS INDEX: 29.88 KG/M2 | HEIGHT: 64 IN | DIASTOLIC BLOOD PRESSURE: 72 MMHG

## 2019-12-20 DIAGNOSIS — M76.891 HAMSTRING TENDINITIS OF RIGHT THIGH: ICD-10-CM

## 2019-12-20 DIAGNOSIS — Z98.890 S/P LEFT ROTATOR CUFF REPAIR: Primary | ICD-10-CM

## 2019-12-20 PROCEDURE — 99214 OFFICE O/P EST MOD 30 MIN: CPT | Performed by: ORTHOPAEDIC SURGERY

## 2023-10-10 ENCOUNTER — APPOINTMENT (OUTPATIENT)
Dept: CT IMAGING | Age: 78
DRG: 493 | End: 2023-10-10
Payer: MEDICARE

## 2023-10-10 ENCOUNTER — APPOINTMENT (OUTPATIENT)
Dept: GENERAL RADIOLOGY | Age: 78
DRG: 493 | End: 2023-10-10
Payer: MEDICARE

## 2023-10-10 ENCOUNTER — HOSPITAL ENCOUNTER (INPATIENT)
Age: 78
LOS: 7 days | Discharge: NURSING, REHAB OR LONG-TERM CARE | DRG: 493 | End: 2023-10-17
Attending: EMERGENCY MEDICINE | Admitting: FAMILY MEDICINE
Payer: MEDICARE

## 2023-10-10 DIAGNOSIS — S82.891A CLOSED FRACTURE OF RIGHT ANKLE, INITIAL ENCOUNTER: Primary | ICD-10-CM

## 2023-10-10 DIAGNOSIS — S82.841A BIMALLEOLAR ANKLE FRACTURE, RIGHT, CLOSED, INITIAL ENCOUNTER: ICD-10-CM

## 2023-10-10 LAB
ABO + RH BLD: NORMAL
ANION GAP SERPL CALCULATED.3IONS-SCNC: 14 MMOL/L (ref 3–16)
BASOPHILS # BLD: 0 K/UL (ref 0–0.2)
BASOPHILS NFR BLD: 0.4 %
BLD GP AB SCN SERPL QL: NORMAL
BUN SERPL-MCNC: 34 MG/DL (ref 7–20)
CALCIUM SERPL-MCNC: 9.5 MG/DL (ref 8.3–10.6)
CHLORIDE SERPL-SCNC: 97 MMOL/L (ref 99–110)
CO2 SERPL-SCNC: 23 MMOL/L (ref 21–32)
CREAT SERPL-MCNC: 1.5 MG/DL (ref 0.6–1.2)
DEPRECATED RDW RBC AUTO: 14.1 % (ref 12.4–15.4)
EOSINOPHIL # BLD: 0.3 K/UL (ref 0–0.6)
EOSINOPHIL NFR BLD: 3.4 %
GFR SERPLBLD CREATININE-BSD FMLA CKD-EPI: 36 ML/MIN/{1.73_M2}
GLUCOSE SERPL-MCNC: 198 MG/DL (ref 70–99)
HCT VFR BLD AUTO: 32.7 % (ref 36–48)
HGB BLD-MCNC: 10.9 G/DL (ref 12–16)
LYMPHOCYTES # BLD: 2.1 K/UL (ref 1–5.1)
LYMPHOCYTES NFR BLD: 22.9 %
MCH RBC QN AUTO: 29.3 PG (ref 26–34)
MCHC RBC AUTO-ENTMCNC: 33.3 G/DL (ref 31–36)
MCV RBC AUTO: 88 FL (ref 80–100)
MONOCYTES # BLD: 0.5 K/UL (ref 0–1.3)
MONOCYTES NFR BLD: 5.7 %
NEUTROPHILS # BLD: 6.2 K/UL (ref 1.7–7.7)
NEUTROPHILS NFR BLD: 67.6 %
PLATELET # BLD AUTO: 270 K/UL (ref 135–450)
PMV BLD AUTO: 7.6 FL (ref 5–10.5)
POTASSIUM SERPL-SCNC: 4.1 MMOL/L (ref 3.5–5.1)
RBC # BLD AUTO: 3.72 M/UL (ref 4–5.2)
SODIUM SERPL-SCNC: 134 MMOL/L (ref 136–145)
WBC # BLD AUTO: 9.1 K/UL (ref 4–11)

## 2023-10-10 PROCEDURE — 72125 CT NECK SPINE W/O DYE: CPT

## 2023-10-10 PROCEDURE — 70450 CT HEAD/BRAIN W/O DYE: CPT

## 2023-10-10 PROCEDURE — 86901 BLOOD TYPING SEROLOGIC RH(D): CPT

## 2023-10-10 PROCEDURE — 80048 BASIC METABOLIC PNL TOTAL CA: CPT

## 2023-10-10 PROCEDURE — 73590 X-RAY EXAM OF LOWER LEG: CPT

## 2023-10-10 PROCEDURE — 73610 X-RAY EXAM OF ANKLE: CPT

## 2023-10-10 PROCEDURE — 86900 BLOOD TYPING SEROLOGIC ABO: CPT

## 2023-10-10 PROCEDURE — 6360000002 HC RX W HCPCS

## 2023-10-10 PROCEDURE — 85025 COMPLETE CBC W/AUTO DIFF WBC: CPT

## 2023-10-10 PROCEDURE — 99285 EMERGENCY DEPT VISIT HI MDM: CPT

## 2023-10-10 PROCEDURE — 96375 TX/PRO/DX INJ NEW DRUG ADDON: CPT

## 2023-10-10 PROCEDURE — 99223 1ST HOSP IP/OBS HIGH 75: CPT | Performed by: FAMILY MEDICINE

## 2023-10-10 PROCEDURE — 96374 THER/PROPH/DIAG INJ IV PUSH: CPT

## 2023-10-10 PROCEDURE — 86850 RBC ANTIBODY SCREEN: CPT

## 2023-10-10 PROCEDURE — 1200000000 HC SEMI PRIVATE

## 2023-10-10 PROCEDURE — 36415 COLL VENOUS BLD VENIPUNCTURE: CPT

## 2023-10-10 PROCEDURE — 6360000002 HC RX W HCPCS: Performed by: EMERGENCY MEDICINE

## 2023-10-10 RX ORDER — ONDANSETRON 2 MG/ML
4 INJECTION INTRAMUSCULAR; INTRAVENOUS EVERY 6 HOURS PRN
Status: DISCONTINUED | OUTPATIENT
Start: 2023-10-10 | End: 2023-10-17 | Stop reason: HOSPADM

## 2023-10-10 RX ORDER — HYDROMORPHONE HYDROCHLORIDE 1 MG/ML
0.5 INJECTION, SOLUTION INTRAMUSCULAR; INTRAVENOUS; SUBCUTANEOUS EVERY 4 HOURS PRN
Status: DISCONTINUED | OUTPATIENT
Start: 2023-10-10 | End: 2023-10-11

## 2023-10-10 RX ORDER — PROPOFOL 10 MG/ML
INJECTION, EMULSION INTRAVENOUS
Status: COMPLETED
Start: 2023-10-10 | End: 2023-10-10

## 2023-10-10 RX ORDER — SODIUM CHLORIDE 0.9 % (FLUSH) 0.9 %
5-40 SYRINGE (ML) INJECTION PRN
Status: DISCONTINUED | OUTPATIENT
Start: 2023-10-10 | End: 2023-10-17 | Stop reason: HOSPADM

## 2023-10-10 RX ORDER — SODIUM CHLORIDE 0.9 % (FLUSH) 0.9 %
5-40 SYRINGE (ML) INJECTION EVERY 12 HOURS SCHEDULED
Status: DISCONTINUED | OUTPATIENT
Start: 2023-10-10 | End: 2023-10-17 | Stop reason: HOSPADM

## 2023-10-10 RX ORDER — KETOROLAC TROMETHAMINE 30 MG/ML
15 INJECTION, SOLUTION INTRAMUSCULAR; INTRAVENOUS EVERY 6 HOURS PRN
Status: DISPENSED | OUTPATIENT
Start: 2023-10-10 | End: 2023-10-11

## 2023-10-10 RX ORDER — HYDROMORPHONE HYDROCHLORIDE 1 MG/ML
0.5 INJECTION, SOLUTION INTRAMUSCULAR; INTRAVENOUS; SUBCUTANEOUS ONCE
Status: COMPLETED | OUTPATIENT
Start: 2023-10-10 | End: 2023-10-10

## 2023-10-10 RX ORDER — ENOXAPARIN SODIUM 100 MG/ML
40 INJECTION SUBCUTANEOUS DAILY
Status: DISCONTINUED | OUTPATIENT
Start: 2023-10-11 | End: 2023-10-17 | Stop reason: HOSPADM

## 2023-10-10 RX ORDER — SODIUM CHLORIDE 9 MG/ML
INJECTION, SOLUTION INTRAVENOUS PRN
Status: DISCONTINUED | OUTPATIENT
Start: 2023-10-10 | End: 2023-10-17 | Stop reason: HOSPADM

## 2023-10-10 RX ORDER — POLYETHYLENE GLYCOL 3350 17 G/17G
17 POWDER, FOR SOLUTION ORAL DAILY PRN
Status: DISCONTINUED | OUTPATIENT
Start: 2023-10-10 | End: 2023-10-17 | Stop reason: HOSPADM

## 2023-10-10 RX ORDER — ONDANSETRON 2 MG/ML
4 INJECTION INTRAMUSCULAR; INTRAVENOUS EVERY 6 HOURS PRN
Status: DISCONTINUED | OUTPATIENT
Start: 2023-10-10 | End: 2023-10-10 | Stop reason: SDUPTHER

## 2023-10-10 RX ORDER — ONDANSETRON 4 MG/1
4 TABLET, ORALLY DISINTEGRATING ORAL EVERY 8 HOURS PRN
Status: DISCONTINUED | OUTPATIENT
Start: 2023-10-10 | End: 2023-10-17 | Stop reason: HOSPADM

## 2023-10-10 RX ORDER — ACETAMINOPHEN 650 MG/1
650 SUPPOSITORY RECTAL EVERY 6 HOURS PRN
Status: DISCONTINUED | OUTPATIENT
Start: 2023-10-10 | End: 2023-10-17 | Stop reason: HOSPADM

## 2023-10-10 RX ORDER — SODIUM CHLORIDE 9 MG/ML
INJECTION, SOLUTION INTRAVENOUS CONTINUOUS
Status: DISCONTINUED | OUTPATIENT
Start: 2023-10-10 | End: 2023-10-12

## 2023-10-10 RX ORDER — ACETAMINOPHEN 325 MG/1
650 TABLET ORAL EVERY 6 HOURS PRN
Status: DISCONTINUED | OUTPATIENT
Start: 2023-10-10 | End: 2023-10-17 | Stop reason: HOSPADM

## 2023-10-10 RX ADMIN — HYDROMORPHONE HYDROCHLORIDE 0.5 MG: 1 INJECTION, SOLUTION INTRAMUSCULAR; INTRAVENOUS; SUBCUTANEOUS at 20:55

## 2023-10-10 RX ADMIN — PROPOFOL 82.9 MG: 10 INJECTION, EMULSION INTRAVENOUS at 21:02

## 2023-10-10 ASSESSMENT — PAIN DESCRIPTION - LOCATION: LOCATION: ANKLE

## 2023-10-10 ASSESSMENT — LIFESTYLE VARIABLES
HOW OFTEN DO YOU HAVE A DRINK CONTAINING ALCOHOL: NEVER
HOW MANY STANDARD DRINKS CONTAINING ALCOHOL DO YOU HAVE ON A TYPICAL DAY: PATIENT DOES NOT DRINK

## 2023-10-10 ASSESSMENT — PAIN DESCRIPTION - DESCRIPTORS: DESCRIPTORS: ACHING

## 2023-10-10 ASSESSMENT — PAIN SCALES - GENERAL
PAINLEVEL_OUTOF10: 1
PAINLEVEL_OUTOF10: 1

## 2023-10-10 ASSESSMENT — PAIN DESCRIPTION - PAIN TYPE: TYPE: ACUTE PAIN

## 2023-10-10 ASSESSMENT — PAIN DESCRIPTION - ORIENTATION: ORIENTATION: RIGHT

## 2023-10-11 ENCOUNTER — ANESTHESIA (OUTPATIENT)
Dept: OPERATING ROOM | Age: 78
End: 2023-10-11
Payer: MEDICARE

## 2023-10-11 ENCOUNTER — ANESTHESIA EVENT (OUTPATIENT)
Dept: OPERATING ROOM | Age: 78
End: 2023-10-11
Payer: MEDICARE

## 2023-10-11 ENCOUNTER — APPOINTMENT (OUTPATIENT)
Dept: GENERAL RADIOLOGY | Age: 78
DRG: 493 | End: 2023-10-11
Payer: MEDICARE

## 2023-10-11 PROBLEM — Z01.818 PRE-OP EVALUATION: Status: ACTIVE | Noted: 2023-10-11

## 2023-10-11 PROBLEM — S82.891A CLOSED FRACTURE OF RIGHT ANKLE: Status: ACTIVE | Noted: 2023-10-11

## 2023-10-11 LAB
ALBUMIN SERPL-MCNC: 4.2 G/DL (ref 3.4–5)
ALBUMIN/GLOB SERPL: 1.5 {RATIO} (ref 1.1–2.2)
ALP SERPL-CCNC: 60 U/L (ref 40–129)
ALT SERPL-CCNC: 21 U/L (ref 10–40)
ANION GAP SERPL CALCULATED.3IONS-SCNC: 12 MMOL/L (ref 3–16)
AST SERPL-CCNC: 21 U/L (ref 15–37)
BASOPHILS # BLD: 0 K/UL (ref 0–0.2)
BASOPHILS NFR BLD: 0.3 %
BILIRUB SERPL-MCNC: <0.2 MG/DL (ref 0–1)
BUN SERPL-MCNC: 36 MG/DL (ref 7–20)
CALCIUM SERPL-MCNC: 9 MG/DL (ref 8.3–10.6)
CHLORIDE SERPL-SCNC: 101 MMOL/L (ref 99–110)
CO2 SERPL-SCNC: 25 MMOL/L (ref 21–32)
CREAT SERPL-MCNC: 1.6 MG/DL (ref 0.6–1.2)
DEPRECATED RDW RBC AUTO: 14.1 % (ref 12.4–15.4)
EOSINOPHIL # BLD: 0.3 K/UL (ref 0–0.6)
EOSINOPHIL NFR BLD: 3.3 %
GFR SERPLBLD CREATININE-BSD FMLA CKD-EPI: 33 ML/MIN/{1.73_M2}
GLUCOSE BLD-MCNC: 108 MG/DL (ref 70–99)
GLUCOSE BLD-MCNC: 131 MG/DL (ref 70–99)
GLUCOSE BLD-MCNC: 141 MG/DL (ref 70–99)
GLUCOSE BLD-MCNC: 172 MG/DL (ref 70–99)
GLUCOSE BLD-MCNC: 213 MG/DL (ref 70–99)
GLUCOSE SERPL-MCNC: 131 MG/DL (ref 70–99)
HCT VFR BLD AUTO: 31.6 % (ref 36–48)
HGB BLD-MCNC: 10.6 G/DL (ref 12–16)
LYMPHOCYTES # BLD: 2.5 K/UL (ref 1–5.1)
LYMPHOCYTES NFR BLD: 25.1 %
MCH RBC QN AUTO: 29 PG (ref 26–34)
MCHC RBC AUTO-ENTMCNC: 33.5 G/DL (ref 31–36)
MCV RBC AUTO: 86.6 FL (ref 80–100)
MONOCYTES # BLD: 0.7 K/UL (ref 0–1.3)
MONOCYTES NFR BLD: 7.3 %
NEUTROPHILS # BLD: 6.3 K/UL (ref 1.7–7.7)
NEUTROPHILS NFR BLD: 64 %
PERFORMED ON: ABNORMAL
PLATELET # BLD AUTO: 252 K/UL (ref 135–450)
PMV BLD AUTO: 8.1 FL (ref 5–10.5)
POTASSIUM SERPL-SCNC: 4.2 MMOL/L (ref 3.5–5.1)
PROT SERPL-MCNC: 7 G/DL (ref 6.4–8.2)
RBC # BLD AUTO: 3.65 M/UL (ref 4–5.2)
SODIUM SERPL-SCNC: 138 MMOL/L (ref 136–145)
WBC # BLD AUTO: 9.8 K/UL (ref 4–11)

## 2023-10-11 PROCEDURE — C1713 ANCHOR/SCREW BN/BN,TIS/BN: HCPCS | Performed by: ORTHOPAEDIC SURGERY

## 2023-10-11 PROCEDURE — 6360000002 HC RX W HCPCS: Performed by: FAMILY MEDICINE

## 2023-10-11 PROCEDURE — 7100000001 HC PACU RECOVERY - ADDTL 15 MIN: Performed by: ORTHOPAEDIC SURGERY

## 2023-10-11 PROCEDURE — 2500000003 HC RX 250 WO HCPCS: Performed by: NURSE ANESTHETIST, CERTIFIED REGISTERED

## 2023-10-11 PROCEDURE — 2580000003 HC RX 258: Performed by: FAMILY MEDICINE

## 2023-10-11 PROCEDURE — 6360000002 HC RX W HCPCS: Performed by: ANESTHESIOLOGY

## 2023-10-11 PROCEDURE — 6370000000 HC RX 637 (ALT 250 FOR IP): Performed by: FAMILY MEDICINE

## 2023-10-11 PROCEDURE — 2709999900 HC NON-CHARGEABLE SUPPLY: Performed by: ORTHOPAEDIC SURGERY

## 2023-10-11 PROCEDURE — 93005 ELECTROCARDIOGRAM TRACING: CPT | Performed by: INTERNAL MEDICINE

## 2023-10-11 PROCEDURE — 3600000014 HC SURGERY LEVEL 4 ADDTL 15MIN: Performed by: ORTHOPAEDIC SURGERY

## 2023-10-11 PROCEDURE — 3700000000 HC ANESTHESIA ATTENDED CARE: Performed by: ORTHOPAEDIC SURGERY

## 2023-10-11 PROCEDURE — 3700000001 HC ADD 15 MINUTES (ANESTHESIA): Performed by: ORTHOPAEDIC SURGERY

## 2023-10-11 PROCEDURE — 85025 COMPLETE CBC W/AUTO DIFF WBC: CPT

## 2023-10-11 PROCEDURE — 6360000002 HC RX W HCPCS: Performed by: NURSE ANESTHETIST, CERTIFIED REGISTERED

## 2023-10-11 PROCEDURE — 6370000000 HC RX 637 (ALT 250 FOR IP): Performed by: ANESTHESIOLOGY

## 2023-10-11 PROCEDURE — 2720000010 HC SURG SUPPLY STERILE: Performed by: ORTHOPAEDIC SURGERY

## 2023-10-11 PROCEDURE — 80053 COMPREHEN METABOLIC PANEL: CPT

## 2023-10-11 PROCEDURE — C1769 GUIDE WIRE: HCPCS | Performed by: ORTHOPAEDIC SURGERY

## 2023-10-11 PROCEDURE — 7100000000 HC PACU RECOVERY - FIRST 15 MIN: Performed by: ORTHOPAEDIC SURGERY

## 2023-10-11 PROCEDURE — 1200000000 HC SEMI PRIVATE

## 2023-10-11 PROCEDURE — 0QSJ04Z REPOSITION RIGHT FIBULA WITH INTERNAL FIXATION DEVICE, OPEN APPROACH: ICD-10-PCS | Performed by: ORTHOPAEDIC SURGERY

## 2023-10-11 PROCEDURE — 73610 X-RAY EXAM OF ANKLE: CPT

## 2023-10-11 PROCEDURE — 36415 COLL VENOUS BLD VENIPUNCTURE: CPT

## 2023-10-11 PROCEDURE — 3600000004 HC SURGERY LEVEL 4 BASE: Performed by: ORTHOPAEDIC SURGERY

## 2023-10-11 PROCEDURE — 99223 1ST HOSP IP/OBS HIGH 75: CPT | Performed by: INTERNAL MEDICINE

## 2023-10-11 PROCEDURE — 2580000003 HC RX 258: Performed by: NURSE ANESTHETIST, CERTIFIED REGISTERED

## 2023-10-11 PROCEDURE — 64445 NJX AA&/STRD SCIATIC NRV IMG: CPT | Performed by: ANESTHESIOLOGY

## 2023-10-11 PROCEDURE — 64447 NJX AA&/STRD FEMORAL NRV IMG: CPT | Performed by: ANESTHESIOLOGY

## 2023-10-11 DEVICE — SCREW BNE L14MM DIA2.7MM CORT S STL ST LOK FULL THRD T8: Type: IMPLANTABLE DEVICE | Site: ANKLE | Status: FUNCTIONAL

## 2023-10-11 DEVICE — PLATE BNE L99MM 5 H R DST LAT FIBULAR S STL LOK COMPR FOR: Type: IMPLANTABLE DEVICE | Site: ANKLE | Status: FUNCTIONAL

## 2023-10-11 DEVICE — SCREW BNE L12MM DIA3.5MM CORT S STL ST NONCANNULATED LOK: Type: IMPLANTABLE DEVICE | Site: ANKLE | Status: FUNCTIONAL

## 2023-10-11 DEVICE — SCREW BNE L12MM DIA2.7MM CORT S STL ST LOK FULL THRD T8: Type: IMPLANTABLE DEVICE | Site: ANKLE | Status: FUNCTIONAL

## 2023-10-11 DEVICE — SCREW BNE L36MM DIA4MM S STL CANN LNG HALF THRD SM HEX SOCK: Type: IMPLANTABLE DEVICE | Site: ANKLE | Status: FUNCTIONAL

## 2023-10-11 DEVICE — SCREW BNE L10MM DIA2.7MM CORT S STL ST LOK FULL THRD T8: Type: IMPLANTABLE DEVICE | Site: ANKLE | Status: FUNCTIONAL

## 2023-10-11 RX ORDER — SODIUM CHLORIDE, SODIUM LACTATE, POTASSIUM CHLORIDE, CALCIUM CHLORIDE 600; 310; 30; 20 MG/100ML; MG/100ML; MG/100ML; MG/100ML
INJECTION, SOLUTION INTRAVENOUS CONTINUOUS PRN
Status: DISCONTINUED | OUTPATIENT
Start: 2023-10-11 | End: 2023-10-11 | Stop reason: SDUPTHER

## 2023-10-11 RX ORDER — IPRATROPIUM BROMIDE AND ALBUTEROL SULFATE 2.5; .5 MG/3ML; MG/3ML
1 SOLUTION RESPIRATORY (INHALATION)
Status: DISCONTINUED | OUTPATIENT
Start: 2023-10-11 | End: 2023-10-11 | Stop reason: HOSPADM

## 2023-10-11 RX ORDER — HYDROMORPHONE HYDROCHLORIDE 1 MG/ML
0.5 INJECTION, SOLUTION INTRAMUSCULAR; INTRAVENOUS; SUBCUTANEOUS EVERY 5 MIN PRN
Status: DISCONTINUED | OUTPATIENT
Start: 2023-10-11 | End: 2023-10-11 | Stop reason: HOSPADM

## 2023-10-11 RX ORDER — PROCHLORPERAZINE EDISYLATE 5 MG/ML
5 INJECTION INTRAMUSCULAR; INTRAVENOUS
Status: DISCONTINUED | OUTPATIENT
Start: 2023-10-11 | End: 2023-10-11 | Stop reason: HOSPADM

## 2023-10-11 RX ORDER — FAMOTIDINE 10 MG/ML
INJECTION, SOLUTION INTRAVENOUS PRN
Status: DISCONTINUED | OUTPATIENT
Start: 2023-10-11 | End: 2023-10-11 | Stop reason: SDUPTHER

## 2023-10-11 RX ORDER — SODIUM CHLORIDE 9 MG/ML
INJECTION, SOLUTION INTRAVENOUS PRN
Status: DISCONTINUED | OUTPATIENT
Start: 2023-10-11 | End: 2023-10-11 | Stop reason: HOSPADM

## 2023-10-11 RX ORDER — PROPOFOL 10 MG/ML
INJECTION, EMULSION INTRAVENOUS PRN
Status: DISCONTINUED | OUTPATIENT
Start: 2023-10-11 | End: 2023-10-11 | Stop reason: SDUPTHER

## 2023-10-11 RX ORDER — HYDROMORPHONE HYDROCHLORIDE 1 MG/ML
1 INJECTION, SOLUTION INTRAMUSCULAR; INTRAVENOUS; SUBCUTANEOUS
Status: DISCONTINUED | OUTPATIENT
Start: 2023-10-11 | End: 2023-10-12 | Stop reason: SDUPTHER

## 2023-10-11 RX ORDER — AMLODIPINE BESYLATE 5 MG/1
5 TABLET ORAL DAILY
Status: DISCONTINUED | OUTPATIENT
Start: 2023-10-11 | End: 2023-10-16

## 2023-10-11 RX ORDER — FENTANYL CITRATE 50 UG/ML
INJECTION, SOLUTION INTRAMUSCULAR; INTRAVENOUS PRN
Status: DISCONTINUED | OUTPATIENT
Start: 2023-10-11 | End: 2023-10-11 | Stop reason: SDUPTHER

## 2023-10-11 RX ORDER — ROPIVACAINE HYDROCHLORIDE 5 MG/ML
INJECTION, SOLUTION EPIDURAL; INFILTRATION; PERINEURAL
Status: COMPLETED
Start: 2023-10-11 | End: 2023-10-11

## 2023-10-11 RX ORDER — ONDANSETRON 2 MG/ML
INJECTION INTRAMUSCULAR; INTRAVENOUS PRN
Status: DISCONTINUED | OUTPATIENT
Start: 2023-10-11 | End: 2023-10-11 | Stop reason: SDUPTHER

## 2023-10-11 RX ORDER — MIDAZOLAM HYDROCHLORIDE 1 MG/ML
INJECTION INTRAMUSCULAR; INTRAVENOUS
Status: COMPLETED
Start: 2023-10-11 | End: 2023-10-11

## 2023-10-11 RX ORDER — DIPHENHYDRAMINE HYDROCHLORIDE 50 MG/ML
12.5 INJECTION INTRAMUSCULAR; INTRAVENOUS
Status: DISCONTINUED | OUTPATIENT
Start: 2023-10-11 | End: 2023-10-11 | Stop reason: HOSPADM

## 2023-10-11 RX ORDER — ROPIVACAINE HYDROCHLORIDE 5 MG/ML
INJECTION, SOLUTION EPIDURAL; INFILTRATION; PERINEURAL PRN
Status: DISCONTINUED | OUTPATIENT
Start: 2023-10-11 | End: 2023-10-11 | Stop reason: SDUPTHER

## 2023-10-11 RX ORDER — METOPROLOL TARTRATE 5 MG/5ML
INJECTION INTRAVENOUS PRN
Status: DISCONTINUED | OUTPATIENT
Start: 2023-10-11 | End: 2023-10-11 | Stop reason: SDUPTHER

## 2023-10-11 RX ORDER — LORAZEPAM 2 MG/ML
0.5 INJECTION INTRAMUSCULAR
Status: DISCONTINUED | OUTPATIENT
Start: 2023-10-11 | End: 2023-10-11 | Stop reason: HOSPADM

## 2023-10-11 RX ORDER — HYDROMORPHONE HYDROCHLORIDE 1 MG/ML
0.5 INJECTION, SOLUTION INTRAMUSCULAR; INTRAVENOUS; SUBCUTANEOUS ONCE
Status: COMPLETED | OUTPATIENT
Start: 2023-10-11 | End: 2023-10-11

## 2023-10-11 RX ORDER — DEXTROSE MONOHYDRATE 100 MG/ML
INJECTION, SOLUTION INTRAVENOUS CONTINUOUS PRN
Status: DISCONTINUED | OUTPATIENT
Start: 2023-10-11 | End: 2023-10-17 | Stop reason: HOSPADM

## 2023-10-11 RX ORDER — HYDRALAZINE HYDROCHLORIDE 20 MG/ML
INJECTION INTRAMUSCULAR; INTRAVENOUS PRN
Status: DISCONTINUED | OUTPATIENT
Start: 2023-10-11 | End: 2023-10-11 | Stop reason: SDUPTHER

## 2023-10-11 RX ORDER — SODIUM CHLORIDE 0.9 % (FLUSH) 0.9 %
5-40 SYRINGE (ML) INJECTION PRN
Status: DISCONTINUED | OUTPATIENT
Start: 2023-10-11 | End: 2023-10-11 | Stop reason: HOSPADM

## 2023-10-11 RX ORDER — ROCURONIUM BROMIDE 10 MG/ML
INJECTION, SOLUTION INTRAVENOUS PRN
Status: DISCONTINUED | OUTPATIENT
Start: 2023-10-11 | End: 2023-10-11 | Stop reason: SDUPTHER

## 2023-10-11 RX ORDER — INSULIN LISPRO 100 [IU]/ML
0-4 INJECTION, SOLUTION INTRAVENOUS; SUBCUTANEOUS
Status: DISCONTINUED | OUTPATIENT
Start: 2023-10-11 | End: 2023-10-17 | Stop reason: HOSPADM

## 2023-10-11 RX ORDER — ONDANSETRON 2 MG/ML
4 INJECTION INTRAMUSCULAR; INTRAVENOUS
Status: DISCONTINUED | OUTPATIENT
Start: 2023-10-11 | End: 2023-10-11 | Stop reason: HOSPADM

## 2023-10-11 RX ORDER — ACETAMINOPHEN 500 MG
1000 TABLET ORAL EVERY 6 HOURS PRN
Status: DISCONTINUED | OUTPATIENT
Start: 2023-10-11 | End: 2023-10-11 | Stop reason: HOSPADM

## 2023-10-11 RX ORDER — SODIUM CHLORIDE 0.9 % (FLUSH) 0.9 %
5-40 SYRINGE (ML) INJECTION EVERY 12 HOURS SCHEDULED
Status: DISCONTINUED | OUTPATIENT
Start: 2023-10-11 | End: 2023-10-11 | Stop reason: HOSPADM

## 2023-10-11 RX ORDER — CARVEDILOL 12.5 MG/1
12.5 TABLET ORAL 2 TIMES DAILY WITH MEALS
Status: DISCONTINUED | OUTPATIENT
Start: 2023-10-11 | End: 2023-10-17 | Stop reason: HOSPADM

## 2023-10-11 RX ORDER — SUCCINYLCHOLINE/SOD CL,ISO/PF 100 MG/5ML
SYRINGE (ML) INTRAVENOUS PRN
Status: DISCONTINUED | OUTPATIENT
Start: 2023-10-11 | End: 2023-10-11 | Stop reason: SDUPTHER

## 2023-10-11 RX ORDER — FENTANYL CITRATE 50 UG/ML
INJECTION, SOLUTION INTRAMUSCULAR; INTRAVENOUS
Status: COMPLETED
Start: 2023-10-11 | End: 2023-10-11

## 2023-10-11 RX ORDER — FENTANYL CITRATE 50 UG/ML
25 INJECTION, SOLUTION INTRAMUSCULAR; INTRAVENOUS EVERY 5 MIN PRN
Status: DISCONTINUED | OUTPATIENT
Start: 2023-10-11 | End: 2023-10-11 | Stop reason: HOSPADM

## 2023-10-11 RX ORDER — MIDAZOLAM HYDROCHLORIDE 1 MG/ML
INJECTION INTRAMUSCULAR; INTRAVENOUS PRN
Status: DISCONTINUED | OUTPATIENT
Start: 2023-10-11 | End: 2023-10-11 | Stop reason: SDUPTHER

## 2023-10-11 RX ORDER — ESMOLOL HYDROCHLORIDE 10 MG/ML
INJECTION INTRAVENOUS PRN
Status: DISCONTINUED | OUTPATIENT
Start: 2023-10-11 | End: 2023-10-11 | Stop reason: SDUPTHER

## 2023-10-11 RX ORDER — LIDOCAINE HYDROCHLORIDE 20 MG/ML
INJECTION, SOLUTION EPIDURAL; INFILTRATION; INTRACAUDAL; PERINEURAL PRN
Status: DISCONTINUED | OUTPATIENT
Start: 2023-10-11 | End: 2023-10-11 | Stop reason: SDUPTHER

## 2023-10-11 RX ORDER — LABETALOL HYDROCHLORIDE 5 MG/ML
10 INJECTION, SOLUTION INTRAVENOUS
Status: DISCONTINUED | OUTPATIENT
Start: 2023-10-11 | End: 2023-10-11 | Stop reason: HOSPADM

## 2023-10-11 RX ORDER — INSULIN LISPRO 100 [IU]/ML
0-4 INJECTION, SOLUTION INTRAVENOUS; SUBCUTANEOUS NIGHTLY
Status: DISCONTINUED | OUTPATIENT
Start: 2023-10-11 | End: 2023-10-17 | Stop reason: HOSPADM

## 2023-10-11 RX ADMIN — SODIUM CHLORIDE, SODIUM LACTATE, POTASSIUM CHLORIDE, AND CALCIUM CHLORIDE: .6; .31; .03; .02 INJECTION, SOLUTION INTRAVENOUS at 18:01

## 2023-10-11 RX ADMIN — MIDAZOLAM HYDROCHLORIDE 2 MG: 2 INJECTION, SOLUTION INTRAMUSCULAR; INTRAVENOUS at 16:48

## 2023-10-11 RX ADMIN — SODIUM CHLORIDE, SODIUM LACTATE, POTASSIUM CHLORIDE, AND CALCIUM CHLORIDE: .6; .31; .03; .02 INJECTION, SOLUTION INTRAVENOUS at 16:48

## 2023-10-11 RX ADMIN — HYDROMORPHONE HYDROCHLORIDE 1 MG: 1 INJECTION, SOLUTION INTRAMUSCULAR; INTRAVENOUS; SUBCUTANEOUS at 08:33

## 2023-10-11 RX ADMIN — SODIUM CHLORIDE, PRESERVATIVE FREE 10 ML: 5 INJECTION INTRAVENOUS at 08:33

## 2023-10-11 RX ADMIN — METOPROLOL TARTRATE 5 MG: 1 INJECTION, SOLUTION INTRAVENOUS at 18:11

## 2023-10-11 RX ADMIN — Medication 120 MG: at 16:55

## 2023-10-11 RX ADMIN — FAMOTIDINE 20 MG: 10 INJECTION, SOLUTION INTRAVENOUS at 17:11

## 2023-10-11 RX ADMIN — FENTANYL CITRATE 50 MCG: 50 INJECTION, SOLUTION INTRAMUSCULAR; INTRAVENOUS at 16:55

## 2023-10-11 RX ADMIN — METOPROLOL TARTRATE 5 MG: 1 INJECTION, SOLUTION INTRAVENOUS at 18:22

## 2023-10-11 RX ADMIN — HYDROMORPHONE HYDROCHLORIDE 0.5 MG: 1 INJECTION, SOLUTION INTRAMUSCULAR; INTRAVENOUS; SUBCUTANEOUS at 00:22

## 2023-10-11 RX ADMIN — AMLODIPINE BESYLATE 5 MG: 5 TABLET ORAL at 08:32

## 2023-10-11 RX ADMIN — FENTANYL CITRATE 50 MCG: 50 INJECTION, SOLUTION INTRAMUSCULAR; INTRAVENOUS at 17:21

## 2023-10-11 RX ADMIN — HYDRALAZINE HYDROCHLORIDE 3 MG: 20 INJECTION INTRAMUSCULAR; INTRAVENOUS at 17:52

## 2023-10-11 RX ADMIN — SUGAMMADEX 200 MG: 100 INJECTION, SOLUTION INTRAVENOUS at 18:19

## 2023-10-11 RX ADMIN — HYDRALAZINE HYDROCHLORIDE 2 MG: 20 INJECTION INTRAMUSCULAR; INTRAVENOUS at 17:47

## 2023-10-11 RX ADMIN — LIDOCAINE HYDROCHLORIDE 50 MG: 20 INJECTION, SOLUTION EPIDURAL; INFILTRATION; INTRACAUDAL; PERINEURAL at 16:55

## 2023-10-11 RX ADMIN — HYDRALAZINE HYDROCHLORIDE 5 MG: 20 INJECTION INTRAMUSCULAR; INTRAVENOUS at 18:09

## 2023-10-11 RX ADMIN — METOPROLOL TARTRATE 2.5 MG: 1 INJECTION, SOLUTION INTRAVENOUS at 19:08

## 2023-10-11 RX ADMIN — ROPIVACAINE HYDROCHLORIDE 20 ML: 5 INJECTION, SOLUTION EPIDURAL; INFILTRATION; PERINEURAL at 18:56

## 2023-10-11 RX ADMIN — ROPIVACAINE HYDROCHLORIDE 30 ML: 5 INJECTION, SOLUTION EPIDURAL; INFILTRATION; PERINEURAL at 19:00

## 2023-10-11 RX ADMIN — HYDROMORPHONE HYDROCHLORIDE 0.5 MG: 1 INJECTION, SOLUTION INTRAMUSCULAR; INTRAVENOUS; SUBCUTANEOUS at 05:15

## 2023-10-11 RX ADMIN — HYDRALAZINE HYDROCHLORIDE 5 MG: 20 INJECTION INTRAMUSCULAR; INTRAVENOUS at 17:58

## 2023-10-11 RX ADMIN — ONDANSETRON 4 MG: 2 INJECTION INTRAMUSCULAR; INTRAVENOUS at 17:11

## 2023-10-11 RX ADMIN — ROCURONIUM BROMIDE 50 MG: 10 INJECTION, SOLUTION INTRAVENOUS at 17:05

## 2023-10-11 RX ADMIN — KETOROLAC TROMETHAMINE 15 MG: 30 INJECTION, SOLUTION INTRAMUSCULAR; INTRAVENOUS at 03:10

## 2023-10-11 RX ADMIN — SODIUM CHLORIDE: 9 INJECTION, SOLUTION INTRAVENOUS at 00:21

## 2023-10-11 RX ADMIN — CARVEDILOL 12.5 MG: 12.5 TABLET, FILM COATED ORAL at 08:32

## 2023-10-11 RX ADMIN — ESMOLOL HYDROCHLORIDE 30 MG: 10 INJECTION, SOLUTION INTRAVENOUS at 18:40

## 2023-10-11 RX ADMIN — PROPOFOL 150 MG: 10 INJECTION, EMULSION INTRAVENOUS at 16:55

## 2023-10-11 RX ADMIN — FENTANYL CITRATE 100 MCG: 50 INJECTION, SOLUTION INTRAMUSCULAR; INTRAVENOUS at 17:42

## 2023-10-11 RX ADMIN — ENOXAPARIN SODIUM 40 MG: 100 INJECTION SUBCUTANEOUS at 08:32

## 2023-10-11 RX ADMIN — FENTANYL CITRATE 50 MCG: 50 INJECTION, SOLUTION INTRAMUSCULAR; INTRAVENOUS at 16:48

## 2023-10-11 RX ADMIN — METOPROLOL TARTRATE 2.5 MG: 1 INJECTION, SOLUTION INTRAVENOUS at 18:38

## 2023-10-11 RX ADMIN — ACETAMINOPHEN 1000 MG: 500 TABLET ORAL at 20:20

## 2023-10-11 RX ADMIN — HYDROMORPHONE HYDROCHLORIDE 0.5 MG: 1 INJECTION, SOLUTION INTRAMUSCULAR; INTRAVENOUS; SUBCUTANEOUS at 04:04

## 2023-10-11 ASSESSMENT — PAIN DESCRIPTION - PAIN TYPE
TYPE: ACUTE PAIN

## 2023-10-11 ASSESSMENT — PAIN DESCRIPTION - ONSET
ONSET: ON-GOING

## 2023-10-11 ASSESSMENT — PAIN SCALES - GENERAL
PAINLEVEL_OUTOF10: 2
PAINLEVEL_OUTOF10: 6
PAINLEVEL_OUTOF10: 3
PAINLEVEL_OUTOF10: 8
PAINLEVEL_OUTOF10: 8
PAINLEVEL_OUTOF10: 3
PAINLEVEL_OUTOF10: 10
PAINLEVEL_OUTOF10: 2
PAINLEVEL_OUTOF10: 8
PAINLEVEL_OUTOF10: 4
PAINLEVEL_OUTOF10: 0

## 2023-10-11 ASSESSMENT — PAIN DESCRIPTION - LOCATION
LOCATION: FOOT
LOCATION: FOOT
LOCATION: ANKLE

## 2023-10-11 ASSESSMENT — PAIN DESCRIPTION - ORIENTATION
ORIENTATION: RIGHT

## 2023-10-11 ASSESSMENT — PAIN DESCRIPTION - FREQUENCY
FREQUENCY: CONTINUOUS

## 2023-10-11 ASSESSMENT — PAIN - FUNCTIONAL ASSESSMENT
PAIN_FUNCTIONAL_ASSESSMENT: PREVENTS OR INTERFERES SOME ACTIVE ACTIVITIES AND ADLS
PAIN_FUNCTIONAL_ASSESSMENT: PREVENTS OR INTERFERES WITH MANY ACTIVE NOT PASSIVE ACTIVITIES

## 2023-10-11 ASSESSMENT — PAIN DESCRIPTION - DESCRIPTORS
DESCRIPTORS: ACHING
DESCRIPTORS: SHARP
DESCRIPTORS: ACHING

## 2023-10-11 ASSESSMENT — PAIN SCALES - WONG BAKER
WONGBAKER_NUMERICALRESPONSE: 0
WONGBAKER_NUMERICALRESPONSE: 0

## 2023-10-11 NOTE — PROGRESS NOTES
4 Eyes Skin Assessment     NAME:  Yumiko Frederick  YOB: 1945  MEDICAL RECORD NUMBER:  [id number]    The patient is being assessed for  Admission    I agree that at least one RN has performed a thorough Head to Toe Skin Assessment on the patient. ALL assessment sites listed below have been assessed. Areas assessed by both nurses:    Head, Face, Ears, Shoulders, Back, Chest, Arms, Elbows, Hands, Sacrum. Buttock, Coccyx, Ischium, Legs. Feet and Heels, and Under Medical Devices         Does the Patient have a Wound?  No noted wound(s)       Dionte Prevention initiated by RN: No  Wound Care Orders initiated by RN: No    Pressure Injury (Stage 3,4, Unstageable, DTI, NWPT, and Complex wounds) if present, place Wound referral order by RN under : No    New Ostomies, if present place, Ostomy referral order under : No     Nurse 1 eSignature: Electronically signed by Mary Dolan RN on 10/11/23 at 5:13 AM EDT    **SHARE this note so that the co-signing nurse can place an eSignature**    Nurse 2 eSignature: Electronically signed by Mariah Ulloa RN on 10/11/23 at 5:16 AM EDT

## 2023-10-11 NOTE — PROGRESS NOTES
Physical/ Occupational Therapy    Orders received, chart reviewed. Per ortho pt to OR this PM for R ankle ORIF, will hold evals and evaluate post op.      Shivani Part, PT, DPT   Rachel Louise, OTR/L [id number]

## 2023-10-11 NOTE — ANESTHESIA POSTPROCEDURE EVALUATION
Department of Anesthesiology  Postprocedure Note    Patient: Nuria Lin  MRN: [id number]  YOB: 1945  Date of evaluation: 10/11/2023      Procedure Summary     Date: 10/11/23 Room / Location: [address] [address] [facility]    Anesthesia Start: 1648 Anesthesia Stop: 1913    Procedure: OPEN REDUCTION INTERNAL FIXATION RIGHT ANKLE (Right: Ankle) Diagnosis:       Type I or II open fracture of right ankle, initial encounter      (Type I or II open fracture of right ankle, initial encounter [S82.891B])    Surgeons: Diego Toro MD Responsible Provider: Jonathon Choe DO    Anesthesia Type: general ASA Status: 3          Anesthesia Type: No value filed.     Vamsi Phase I: Vamsi Score: 10    Vamsi Phase II:        Anesthesia Post Evaluation    Patient location during evaluation: PACU  Patient participation: complete - patient participated  Level of consciousness: awake and alert  Pain score: 0  Airway patency: patent  Nausea & Vomiting: no nausea and no vomiting  Cardiovascular status: blood pressure returned to baseline  Respiratory status: acceptable  Hydration status: euvolemic  Multimodal analgesia pain management approach  Pain management: adequate

## 2023-10-11 NOTE — H&P
Ortho brief note. Full H&P to follow    Right ankle displaced bimalleolar fracture    Plan for ORIF this afternoon.  Please keep NPO and obtain consent

## 2023-10-11 NOTE — PROGRESS NOTES
Pt. Oriented x4. VSS on RA. Pt. Managing pain per MAR. Pt. Made NPO @midnight. Pt. Voiding well via bedpan. All fall precautions in place and call light is within reach.

## 2023-10-11 NOTE — PROGRESS NOTES
Patient is alert and oriented x4. VSS on 2 L NC. Patient appeared very lethargic this shift, sleeping between care. Patient was given dilaudid and family states she becomes very lethargic when given pain medication, patient's vitals remained stable throughout the shift. Voiding well via purewick, no bm this shift. Patient was NPO since midnight and on bedrest. Patient is currently in the OR for ORIF or R ankle.      Electronically signed by Kavitha Zavala RN on 10/11/2023 at 5:52 PM

## 2023-10-11 NOTE — ED NOTES
ED TO INPATIENT SBAR HANDOFF    Patient Name: Sebastian Frias   :  1945  78 y.o. MRN:  [id number]  Preferred Name  [address]  ED Room #:  E50/G68-60  Family/Caregiver Present yes   Restraints no   Sitter no   Sepsis Risk Score Sepsis Risk Score: 1.7    Situation  Code Status: Full Code No additional code details. Allergies: Metoclopramide hcl, Morphine, and Percocet [oxycodone-acetaminophen]  Weight: Patient Vitals for the past 96 hrs (Last 3 readings):   Weight   10/10/23 2046 182 lb 12.8 oz (82.9 kg)     Arrived from: home  Chief Complaint:   Chief Complaint   Patient presents with    Ankle Pain     Pt slid on the steps half an hour ago, right ankle swelling and deformed. Hit her head, on blood thinners. Hospital Problem/Diagnosis:  Principal Problem:    Bimalleolar ankle fracture, right, closed, initial encounter  Resolved Problems:    * No resolved hospital problems. *    Imaging:   XR ANKLE RIGHT (MIN 3 VIEWS)   Final Result   1. Posterior dislocation of the talus in respect to the tibial plafond. 2. Displaced bimalleolar fractures. RIGHT TIBIA AND FIBULA 2 VIEWS      CLINICAL HISTORY: Pain. FINDINGS:  AP and lateral views of the right tibia and fibula were obtained. There are displaced bimalleolar fractures. No other tibial or fibular fracture   is identified. Surgical clips are identified within the soft tissues of the proximal and distal   calf. Correlate with prior vascular surgery history. IMPRESSION:    1. Posterior dislocation of the talus in respect to the tibial plafond. 2. Displaced bimalleolar fractures. XR TIBIA FIBULA RIGHT (2 VIEWS)   Final Result   1. Posterior dislocation of the talus in respect to the tibial plafond. 2. Displaced bimalleolar fractures. RIGHT TIBIA AND FIBULA 2 VIEWS      CLINICAL HISTORY: Pain. FINDINGS:  AP and lateral views of the right tibia and fibula were obtained.    There are displaced bimalleolar

## 2023-10-11 NOTE — ANESTHESIA PROCEDURE NOTES
Peripheral Block    Patient location during procedure: pre-op  Reason for block: procedure for pain, post-op pain management and at surgeon's request  Start time: 10/11/2023 7:00 PM  End time: 10/11/2023 7:05 PM  Staffing  Performed: anesthesiologist   Performed by: Bri Webber DO  Authorized by: Bri Webber DO    Preanesthetic Checklist  Completed: patient identified, IV checked, site marked, risks and benefits discussed, surgical/procedural consents, equipment checked, pre-op evaluation, timeout performed, anesthesia consent given, oxygen available, monitors applied/VS acknowledged, fire risk safety assessment completed and verbalized and blood product R/B/A discussed and consented  Peripheral Block   Patient monitoring: cardiac monitor, continuous pulse ox, continuous capnometry, frequent blood pressure checks, IV access and oxygen  Block type: Saphenous  Laterality: right  Injection technique: single-shot  Guidance: ultrasound guided    Needle   Needle gauge: 25 G  Needle localization: anatomical landmarks and ultrasound guidance  Assessment   Injection assessment: negative aspiration for heme, no paresthesia on injection and local visualized surrounding nerve on ultrasound  Paresthesia pain: none  Slow fractionated injection: yes  Hemodynamics: stable  Real-time US image taken/store: yes  Outcomes: uncomplicated and patient tolerated procedure well    Additional Notes  Following popliteal block adductor canal block completed. 20 mL 0.5% Ropivacaine injected in 5 mL increments following negative aspiration. Tip of needle in view at all times. Tip of needle in view at all times. No pain or paresthesias on injection. Pt tolerated the procedure well.

## 2023-10-11 NOTE — PLAN OF CARE
Problem: Pain  Goal: Verbalizes/displays adequate comfort level or baseline comfort level  Outcome: Progressing  Note: Pt. Managing pain per MAR. Problem: Safety - Adult  Goal: Free from fall injury  Outcome: Progressing  Note: All fall precautions in place and call light is within reach.

## 2023-10-11 NOTE — PLAN OF CARE
Problem: Discharge Planning  Goal: Discharge to home or other facility with appropriate resources  Outcome: Progressing  Flowsheets   Discharge to home or other facility with appropriate resources: Identify barriers to discharge with patient and caregiver   Pt involved in discharge planning. Barriers to discharge discussed with patient. Discharge learning needs identified. Discuss with patient any additional needed resources and transportation plans. Case management following plan of care. Problem: Pain  Goal: Verbalizes/displays adequate comfort level or baseline comfort level  10/11/2023 1029 by Gurdeep Hirsch RN  Outcome: Progressing   Pt endorsing pain to 8. Being treated with PRN pain medication, rest, and frequent repositioning with pillow support for comfort and pressure relief. Pt reports some relief from pain with above interventions. Problem: Safety - Adult  Goal: Free from fall injury  10/11/2023 1029 by Gurdeep Hirsch RN  Outcome: Progressing   All fall precautions in place. Bed locked and in lowest position with alarm on. Overbed table and personal belonings within reach. Call light within reach and patient instructed to use call light for assistance. Non-skid socks on.

## 2023-10-11 NOTE — ANESTHESIA PRE PROCEDURE
Department of Anesthesiology  Preprocedure Note       Name:  Yohannes Stallings   Age:  78 y.o.  :  1945                                          MRN:  [id number]         Date:  10/11/2023      Surgeon: Rachel Welch):  Jody Martins MD    Procedure: OPEN REDUCTION INTERNAL FIXATION RIGHT ANKLE (Right: Ankle)    Medications prior to admission:   Prior to Admission medications    Medication Sig Start Date End Date Taking? Authorizing Provider   Semaglutide (OZEMPIC) 0.25 or 0.5 MG/DOSE SOPN Inject 0.5 mg into the skin once a week    Maritza Barry MD   pantoprazole sodium (PROTONIX) 40 MG PACK packet Take 1 packet by mouth every morning (before breakfast)    Maritza Barry MD   CRANBERRY EXTRACT PO Take by mouth    Maritza Barry MD   clopidogrel (PLAVIX) 75 MG tablet Take 75 mg by mouth daily  Patient not taking: Reported on 10/11/2023    Maritza Barry MD   Lactobacillus (PROBIOTIC ACIDOPHILUS PO) Take by mouth    Maritza Barry MD   Omega-3 Fatty Acids (FISH OIL) 500 MG CAPS Take by mouth  Patient not taking: Reported on 10/11/2023    Maritza Barry MD   gabapentin (NEURONTIN) 100 MG capsule Take 1 capsule by mouth.  3 tabs in morning and evening, 2 tabs during day    Maritza Barry MD   carvedilol (COREG) 12.5 MG tablet Take 1 tablet by mouth 2 times daily (with meals) 16   Susy Newton MD   amLODIPine (NORVASC) 5 MG tablet Take 1 tablet by mouth daily 9/3/16   Rosa Torres MD   glimepiride (AMARYL) 2 MG tablet Take 1 tablet by mouth every morning (before breakfast)    Maritza Barry MD   diphenoxylate-atropine (LOMOTIL) 2.5-0.025 MG per tablet Take 1 tablet by mouth 4 times daily as needed for Diarrhea  Patient not taking: Reported on 10/11/2023    Maritza Barry MD   potassium chloride (MICRO-K) 10 MEQ CR capsule Take 1 capsule by mouth daily    Maritza Barry MD   furosemide (LASIX) 40 MG tablet Take 1 tablet by mouth

## 2023-10-11 NOTE — CONSULTS
needed   Patient not taking: Reported on 10/11/2023 1/14/14   Provider, MD Maritza   sertraline (ZOLOFT) 100 MG tablet 1 tablet nightly 4/16/14   Provider, MD Maritza        Allergies:  Metoclopramide hcl, Morphine, and Percocet [oxycodone-acetaminophen]     Review of Systems:       Constitutional: there has been no unanticipated weight loss. There's been no change in energy level, sleep pattern, or activity level. Eyes: No visual changes or diplopia. No scleral icterus. ENT: No Headaches, hearing loss or vertigo. No mouth sores or sore throat. Cardiovascular: No loss of consciousness. No hemoptysis, pleuritic pain, or phlebitis. Respiratory: No cough or wheezing, no sputum production. No hematemesis. Gastrointestinal: No abdominal pain, appetite loss, blood in stools. No change in bowel or bladder habits. Genitourinary: No dysuria, trouble voiding, or hematuria. Musculoskeletal:  No gait disturbance, weakness or joint complaints. Integumentary: No rash or pruritis. All other systems reviewed negative as done.      Physical Examination:    Vitals:    10/11/23 0833   BP:    Pulse:    Resp: 18   Temp:    SpO2:     Weight - Scale: 182 lb 12.8 oz (82.9 kg)         General Appearance:  Alert, cooperative, no distress, appears stated age   Head:  Normocephalic, without obvious abnormality, atraumatic   Eyes:  PERRL, conjunctiva/corneas clear       Nose: Nares normal, no drainage or sinus tenderness   Throat: Lips, mucosa, and tongue normal   Neck: Supple, symmetrical, trachea midline       Lungs:   Clear to auscultation bilaterally, respirations unlabored   Chest Wall:  No tenderness or deformity   Heart:  Regular rate and rhythm, S1, S2 normal, no murmur, rub or gallop   Abdomen:   Soft, non-tender, bowel sounds active all four quadrants           Extremities: Extremities normal, no cyanosis or edema       Skin: Skin color, texture, turgor normal, no rashes or lesions   Pysch: Normal mood and affect Neurologic: Normal gross motor and sensory exam.         Labs  CBC:   Lab Results   Component Value Date/Time    WBC 9.1 10/10/2023 10:25 PM    RBC 3.72 10/10/2023 10:25 PM    HGB 10.9 10/10/2023 10:25 PM    HCT 32.7 10/10/2023 10:25 PM    MCV 88.0 10/10/2023 10:25 PM    RDW 14.1 10/10/2023 10:25 PM     10/10/2023 10:25 PM     CMP:    Lab Results   Component Value Date/Time     10/10/2023 11:10 PM    K 4.1 10/10/2023 11:10 PM    CL 97 10/10/2023 11:10 PM    CO2 23 10/10/2023 11:10 PM    BUN 34 10/10/2023 11:10 PM    CREATININE 1.5 10/10/2023 11:10 PM    GFRAA >60 11/01/2016 07:10 AM    AGRATIO 1.4 11/01/2016 07:10 AM    LABGLOM 36 10/10/2023 11:10 PM    GLUCOSE 198 10/10/2023 11:10 PM    PROT 6.5 11/01/2016 07:10 AM    CALCIUM 9.5 10/10/2023 11:10 PM    BILITOT <0.2 11/01/2016 07:10 AM    ALKPHOS 66 11/01/2016 07:10 AM    AST 27 11/01/2016 07:10 AM    ALT 25 11/01/2016 07:10 AM     PT/INR:  No results found for: \"PTINR\"  No results found for: \"CKTOTAL\", \"CKMB\", \"CKMBINDEX\", \"TROPONINI\"    EKG:  I have reviewed EKG with the following interpretation:  Impression:  Ordered    Assessment  Patient Active Problem List   Diagnosis    AC (acromioclavicular) arthritis    CAD in native artery    HLD (hyperlipidemia)    Essential hypertension    Type 2 diabetes mellitus (HCC)    Lumbar radiculopathy    Chronic low back pain    Foot fracture    Lower extremity weakness    Hypokalemia    Hypomagnesemia    Microcytic anemia    Intractable low back pain    Incomplete rotator cuff tear or rupture of left shoulder, not specified as traumatic    Bimalleolar ankle fracture, right, closed, initial encounter         Plan:    CV stable. No angina. No CHF. Last stress 6/21 negative for ischemia. Since CV stable she would be at increased but not prohibitive risk for ORIF right ankle.

## 2023-10-11 NOTE — ANESTHESIA PROCEDURE NOTES
Peripheral Block    Patient location during procedure: pre-op  Reason for block: procedure for pain, post-op pain management and at surgeon's request  Start time: 10/11/2023 6:55 PM  End time: 10/11/2023 7:00 PM  Staffing  Performed: anesthesiologist   Anesthesiologist: Evelina Fontana DO  Performed by: Evelina Fontana DO  Authorized by: Evelina Fontana DO    Preanesthetic Checklist  Completed: patient identified, IV checked, site marked, risks and benefits discussed, surgical/procedural consents, equipment checked, pre-op evaluation, timeout performed, anesthesia consent given, oxygen available, monitors applied/VS acknowledged, fire risk safety assessment completed and verbalized and blood product R/B/A discussed and consented  Peripheral Block   Patient position: supine  Prep: ChloraPrep  Patient monitoring: cardiac monitor, continuous pulse ox, continuous capnometry, frequent blood pressure checks, IV access and oxygen  Block type: Sciatic  Popliteal  Laterality: right  Injection technique: single-shot  Guidance: ultrasound guided    Assessment   Injection assessment: negative aspiration for heme, no paresthesia on injection, local visualized surrounding nerve on ultrasound and no intravascular symptoms  Paresthesia pain: none  Slow fractionated injection: yes  Hemodynamics: stable  Real-time US image taken/store: yes  Outcomes: uncomplicated and patient tolerated procedure well    Additional Notes  Sterile prep. Timeout with OR RN at [address] [place] [address]. 30 mL 0.5% Ropivacaine injected in 5 mL increments following negative aspiration. Tip of needle in view at all times. Pt tolerated the procedure well.

## 2023-10-11 NOTE — ED TRIAGE NOTES
Pt slid on the steps half an hour ago, right ankle swelling and deformed. Hit her head, on blood thinners.

## 2023-10-11 NOTE — PROGRESS NOTES
Physician Progress Note      Armando Sheikh  CSN #:                  [id number]  :                       1945  ADMIT DATE:       10/10/2023 8:42 PM  [facility] DATE:  RESPONDING  PROVIDER #:        Mary Rouse MD          QUERY TEXT:    Pt admitted with right ankle fx. Pt noted to have combined CHF in [facility] and   Ischemic cmp. If possible, please document in progress notes and discharge   summary if you are evaluating and/or treating any of the following: The medical record reflects the following:  Risk Factors: CAD, ICMP, HTN  Clinical Indicators: Per preop Cards c/s 10/11 \"Hx cad/CABG/PCI/ischemic   cmyop\". 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 Treatment: Sched home Norvasc, Coreg here, home Cozaar & Lasix held currently  Options provided:  -- Chronic Systolic and Diastolic CHF  -- Other - I will add my own diagnosis  -- Disagree - Not applicable / Not valid  -- Disagree - Clinically unable to determine / Unknown  -- Refer to Clinical Documentation Reviewer    PROVIDER RESPONSE TEXT:    This patient has chronic systolic and diastolic CHF. Query created by:  Brett Jiang on 10/11/2023 3:36 PM      Electronically signed by:  Mary Rouse MD 10/11/2023 3:46 PM

## 2023-10-12 LAB
BILIRUB UR QL STRIP.AUTO: NEGATIVE
CLARITY UR: CLEAR
COLOR UR: YELLOW
EKG ATRIAL RATE: 67 BPM
EKG DIAGNOSIS: NORMAL
EKG P AXIS: -2 DEGREES
EKG P-R INTERVAL: 154 MS
EKG Q-T INTERVAL: 424 MS
EKG QRS DURATION: 94 MS
EKG QTC CALCULATION (BAZETT): 448 MS
EKG R AXIS: 50 DEGREES
EKG T AXIS: 87 DEGREES
EKG VENTRICULAR RATE: 67 BPM
GLUCOSE BLD-MCNC: 139 MG/DL (ref 70–99)
GLUCOSE BLD-MCNC: 153 MG/DL (ref 70–99)
GLUCOSE BLD-MCNC: 193 MG/DL (ref 70–99)
GLUCOSE BLD-MCNC: 213 MG/DL (ref 70–99)
GLUCOSE UR STRIP.AUTO-MCNC: NEGATIVE MG/DL
HGB UR QL STRIP.AUTO: NEGATIVE
KETONES UR STRIP.AUTO-MCNC: NEGATIVE MG/DL
LEUKOCYTE ESTERASE UR QL STRIP.AUTO: ABNORMAL
NITRITE UR QL STRIP.AUTO: NEGATIVE
PERFORMED ON: ABNORMAL
PH UR STRIP.AUTO: 6 [PH] (ref 5–8)
PROT UR STRIP.AUTO-MCNC: NEGATIVE MG/DL
RBC #/AREA URNS HPF: NORMAL /HPF (ref 0–4)
SP GR UR STRIP.AUTO: 1.01 (ref 1–1.03)
UA COMPLETE W REFLEX CULTURE PNL UR: ABNORMAL
UA DIPSTICK W REFLEX MICRO PNL UR: YES
URN SPEC COLLECT METH UR: ABNORMAL
UROBILINOGEN UR STRIP-ACNC: 0.2 E.U./DL
WBC #/AREA URNS HPF: NORMAL /HPF (ref 0–5)

## 2023-10-12 PROCEDURE — 81001 URINALYSIS AUTO W/SCOPE: CPT

## 2023-10-12 PROCEDURE — 97162 PT EVAL MOD COMPLEX 30 MIN: CPT

## 2023-10-12 PROCEDURE — 97530 THERAPEUTIC ACTIVITIES: CPT

## 2023-10-12 PROCEDURE — 94761 N-INVAS EAR/PLS OXIMETRY MLT: CPT

## 2023-10-12 PROCEDURE — 2580000003 HC RX 258: Performed by: FAMILY MEDICINE

## 2023-10-12 PROCEDURE — 94640 AIRWAY INHALATION TREATMENT: CPT

## 2023-10-12 PROCEDURE — 97167 OT EVAL HIGH COMPLEX 60 MIN: CPT

## 2023-10-12 PROCEDURE — 93010 ELECTROCARDIOGRAM REPORT: CPT | Performed by: INTERNAL MEDICINE

## 2023-10-12 PROCEDURE — 6360000002 HC RX W HCPCS: Performed by: ORTHOPAEDIC SURGERY

## 2023-10-12 PROCEDURE — 2700000000 HC OXYGEN THERAPY PER DAY

## 2023-10-12 PROCEDURE — 6360000002 HC RX W HCPCS: Performed by: FAMILY MEDICINE

## 2023-10-12 PROCEDURE — 1200000000 HC SEMI PRIVATE

## 2023-10-12 PROCEDURE — 97535 SELF CARE MNGMENT TRAINING: CPT

## 2023-10-12 PROCEDURE — 6370000000 HC RX 637 (ALT 250 FOR IP): Performed by: SURGERY

## 2023-10-12 PROCEDURE — 2580000003 HC RX 258: Performed by: ORTHOPAEDIC SURGERY

## 2023-10-12 PROCEDURE — 6370000000 HC RX 637 (ALT 250 FOR IP): Performed by: FAMILY MEDICINE

## 2023-10-12 PROCEDURE — 94150 VITAL CAPACITY TEST: CPT

## 2023-10-12 RX ORDER — HYDROMORPHONE HYDROCHLORIDE 1 MG/ML
0.5 INJECTION, SOLUTION INTRAMUSCULAR; INTRAVENOUS; SUBCUTANEOUS
Status: DISCONTINUED | OUTPATIENT
Start: 2023-10-12 | End: 2023-10-17 | Stop reason: HOSPADM

## 2023-10-12 RX ORDER — ONDANSETRON 2 MG/ML
4 INJECTION INTRAMUSCULAR; INTRAVENOUS EVERY 6 HOURS PRN
Status: DISCONTINUED | OUTPATIENT
Start: 2023-10-12 | End: 2023-10-12

## 2023-10-12 RX ORDER — SODIUM CHLORIDE 9 MG/ML
INJECTION, SOLUTION INTRAVENOUS PRN
Status: DISCONTINUED | OUTPATIENT
Start: 2023-10-12 | End: 2023-10-17 | Stop reason: HOSPADM

## 2023-10-12 RX ORDER — HYDROCODONE BITARTRATE AND ACETAMINOPHEN 7.5; 325 MG/1; MG/1
1 TABLET ORAL EVERY 4 HOURS PRN
Qty: 42 TABLET | Refills: 0 | Status: SHIPPED | OUTPATIENT
Start: 2023-10-12 | End: 2023-10-19

## 2023-10-12 RX ORDER — IPRATROPIUM BROMIDE AND ALBUTEROL SULFATE 2.5; .5 MG/3ML; MG/3ML
1 SOLUTION RESPIRATORY (INHALATION) EVERY 4 HOURS PRN
Status: DISCONTINUED | OUTPATIENT
Start: 2023-10-12 | End: 2023-10-17 | Stop reason: HOSPADM

## 2023-10-12 RX ORDER — ASPIRIN 325 MG
325 TABLET ORAL 2 TIMES DAILY
Qty: 60 TABLET | Refills: 0 | Status: SHIPPED | OUTPATIENT
Start: 2023-10-12 | End: 2023-11-11

## 2023-10-12 RX ORDER — ENOXAPARIN SODIUM 100 MG/ML
40 INJECTION SUBCUTANEOUS DAILY
Status: DISCONTINUED | OUTPATIENT
Start: 2023-10-12 | End: 2023-10-12 | Stop reason: SDUPTHER

## 2023-10-12 RX ORDER — SODIUM CHLORIDE 0.9 % (FLUSH) 0.9 %
5-40 SYRINGE (ML) INJECTION EVERY 12 HOURS SCHEDULED
Status: DISCONTINUED | OUTPATIENT
Start: 2023-10-12 | End: 2023-10-17 | Stop reason: HOSPADM

## 2023-10-12 RX ORDER — ASPIRIN 325 MG
325 TABLET ORAL 2 TIMES DAILY
Qty: 28 TABLET | Refills: 0 | Status: SHIPPED | OUTPATIENT
Start: 2023-10-12 | End: 2023-10-12 | Stop reason: SDUPTHER

## 2023-10-12 RX ORDER — HYDROMORPHONE HYDROCHLORIDE 1 MG/ML
0.25 INJECTION, SOLUTION INTRAMUSCULAR; INTRAVENOUS; SUBCUTANEOUS
Status: DISCONTINUED | OUTPATIENT
Start: 2023-10-12 | End: 2023-10-17 | Stop reason: HOSPADM

## 2023-10-12 RX ORDER — TRAMADOL HYDROCHLORIDE 50 MG/1
50 TABLET ORAL EVERY 6 HOURS PRN
Status: DISCONTINUED | OUTPATIENT
Start: 2023-10-12 | End: 2023-10-17 | Stop reason: HOSPADM

## 2023-10-12 RX ORDER — SODIUM CHLORIDE 0.9 % (FLUSH) 0.9 %
5-40 SYRINGE (ML) INJECTION PRN
Status: DISCONTINUED | OUTPATIENT
Start: 2023-10-12 | End: 2023-10-17 | Stop reason: HOSPADM

## 2023-10-12 RX ORDER — ONDANSETRON 4 MG/1
4 TABLET, ORALLY DISINTEGRATING ORAL EVERY 8 HOURS PRN
Status: DISCONTINUED | OUTPATIENT
Start: 2023-10-12 | End: 2023-10-12

## 2023-10-12 RX ADMIN — ACETAMINOPHEN 650 MG: 650 SUPPOSITORY RECTAL at 08:16

## 2023-10-12 RX ADMIN — SODIUM CHLORIDE, PRESERVATIVE FREE 10 ML: 5 INJECTION INTRAVENOUS at 08:17

## 2023-10-12 RX ADMIN — SODIUM CHLORIDE, PRESERVATIVE FREE 10 ML: 5 INJECTION INTRAVENOUS at 08:28

## 2023-10-12 RX ADMIN — CARVEDILOL 12.5 MG: 12.5 TABLET, FILM COATED ORAL at 10:16

## 2023-10-12 RX ADMIN — CEFAZOLIN 2000 MG: 2 INJECTION, POWDER, FOR SOLUTION INTRAMUSCULAR; INTRAVENOUS at 15:01

## 2023-10-12 RX ADMIN — ENOXAPARIN SODIUM 40 MG: 100 INJECTION SUBCUTANEOUS at 08:16

## 2023-10-12 RX ADMIN — CEFAZOLIN 2000 MG: 2 INJECTION, POWDER, FOR SOLUTION INTRAMUSCULAR; INTRAVENOUS at 05:17

## 2023-10-12 RX ADMIN — ONDANSETRON 4 MG: 2 INJECTION INTRAMUSCULAR; INTRAVENOUS at 08:15

## 2023-10-12 RX ADMIN — ACETAMINOPHEN 650 MG: 325 TABLET ORAL at 02:21

## 2023-10-12 RX ADMIN — IPRATROPIUM BROMIDE AND ALBUTEROL SULFATE 1 DOSE: .5; 3 SOLUTION RESPIRATORY (INHALATION) at 20:10

## 2023-10-12 RX ADMIN — SODIUM CHLORIDE, PRESERVATIVE FREE 10 ML: 5 INJECTION INTRAVENOUS at 22:51

## 2023-10-12 RX ADMIN — AMLODIPINE BESYLATE 5 MG: 5 TABLET ORAL at 10:16

## 2023-10-12 RX ADMIN — INSULIN LISPRO 1 UNITS: 100 INJECTION, SOLUTION INTRAVENOUS; SUBCUTANEOUS at 12:34

## 2023-10-12 RX ADMIN — ONDANSETRON 4 MG: 2 INJECTION INTRAMUSCULAR; INTRAVENOUS at 17:33

## 2023-10-12 ASSESSMENT — PAIN SCALES - GENERAL
PAINLEVEL_OUTOF10: 2
PAINLEVEL_OUTOF10: 1
PAINLEVEL_OUTOF10: 2
PAINLEVEL_OUTOF10: 0

## 2023-10-12 ASSESSMENT — PAIN SCALES - WONG BAKER
WONGBAKER_NUMERICALRESPONSE: 0

## 2023-10-12 NOTE — PROGRESS NOTES
Pt a/o x4, has intermittent confusion at times, vss. Pt on 2L NC at shift change, MD notified of oxygen requirement change. Pt voiding adequately via incontinence and external catheter. Pt up to chair this shift, pt tolerated activity. Pt pain being managed per MAR. Pt family updated and educated on camera use in pt room, family agreeable to use. All fall precautions are in place, call light within reach.

## 2023-10-12 NOTE — PROGRESS NOTES
chair, Gait belt, Nurse notified  Restraints  Restraints Initially in Place: No     Restrictions  Position Activity Restriction  Other position/activity restrictions: up with assist, NWB on R LE     Subjective   General  Chart Reviewed: Yes  Patient assessed for rehabilitation services?: Yes  Additional Pertinent Hx: Pt admitted due to R ankle pain from falling down steps and hitting head. Imaging revealed posterior dislocation of the talus in respect to the tibial plafond and displaced bimalleolar fractures. On 10/11 pt underwent R ankle ORIF. PMH includes CAD post CABG, DM2, GERD, HTN, Lumbar radiculopathy, and spinal stenosis. Family / Caregiver Present: Yes  Referring Practitioner: Tequila Moralez MD  Subjective  Subjective: Pt supine in bed and agreeable to PT. Pt denies pain. Social/Functional History  Social/Functional History  Lives With: Family (lives with brother in law)  Type of Home: House  Home Layout: One level  Home Access: Stairs to enter without rails  Entrance Stairs - Number of Steps: 1  Bathroom Shower/Tub: Tub/Shower unit  Bathroom Equipment: Grab bars in shower, Toilet raiser  Home Equipment: Cane, Rollator  Has the patient had two or more falls in the past year or any fall with injury in the past year?:  (fall that got pt admitted)  ADL Assistance: Independent  Homemaking Assistance: Independent  Homemaking Responsibilities: Yes  Ambulation Assistance: Independent (using cane when she remembers)  Transfer Assistance: Independent  Active : Yes  Vision/Hearing  Vision  Vision: Impaired  Vision Exceptions: Wears glasses at all times  Hearing  Hearing: Exceptions to [facility]  Hearing Exceptions: Bilateral hearing aid    Cognition   Orientation  Overall Orientation Status: Impaired  Orientation Level: Disoriented to person;Disoriented to place; Disoriented to time;Disoriented to situation  Cognition  Overall Cognitive Status: Exceptions  Following Commands:  Follows one step commands with Goals  Short Term Goals  Time Frame for Short Term Goals: by d/c  Short Term Goal 1: Pt will complete bed mobility with mod I  Short Term Goal 2: Pt will complete sit to stand transfers with LRAD and min x2 with maintenance of WB status  Short Term Goal 3: Pt will complete bed to chair transfers with LRAD and min x2  Short Term Goal 4: Pt will ambulate 5' with LRAD and min x2  Patient Goals   Patient Goals : to go home       Education  Patient Education  Education Given To: Patient; Family  Education Provided: Role of Therapy;Plan of Care;Transfer Training;Precautions  Education Method: Verbal  Barriers to Learning: Cognition  Education Outcome: Continued education needed;Verbalized understanding      Therapy Time   Individual Concurrent Group Co-treatment   Time In 0854         Time Out 0938         Minutes 44            Timed Code Treatment Minutes:  29 min     Total Treatment Minutes:   44 min     Ana Cruz, SPT

## 2023-10-12 NOTE — PROGRESS NOTES
Pt with delayed responses, not fully alert after surgery. Dr Kari Coyle to bedside in PACU. She states patient is back to her pre-op baseline. Pt febrile with temp of 101.5.  1000 mg Tylenol ordered by Dr Kari Coyle and given orally to patient. Call placed to patient's son, Clive Herrera. Clive Rico states pt becomes confused and \"off\" anytime she receives Dilaudid and/or has a UTI. UA ordered by hospitalist.  Pt transported to room 5518 by transporter, Venita Mariano.

## 2023-10-12 NOTE — PLAN OF CARE
Problem: Safety - Adult  Goal: Free from fall injury  Outcome: Progressing   All fall precautions in place. Bed locked and in lowest position with alarm on. Overbed table and personal belonings within reach. Call light within reach and patient instructed to use call light for assistance. Non-skid socks on. Problem: Discharge Planning  Goal: Discharge to home or other facility with appropriate resources  Outcome: Progressing   Pt involved in discharge planning. Barriers to discharge discussed with pt. Discharge learning needs identified. Discussed with pt any additional needed resources and transportation plans.

## 2023-10-12 NOTE — PROGRESS NOTES
Patient IV replaced with a 20 RW, patient had pulled other iv out. No complications, patient tolerated well.

## 2023-10-12 NOTE — DISCHARGE INSTRUCTIONS
Non weight bearing right lower extremity. Keep splint clean, dry and in place. Follow up with Dr. Meek Almanza 2 weeks. Call [phone] to schedule this appointment.

## 2023-10-12 NOTE — PROGRESS NOTES
Occupational Therapy  Facility/Department: [address] [place] [address]   Occupational Therapy Initial Assessment/Treat    Name: Duncan Mckeon  : 1945  MRN: [id number]  Date of Service: 10/12/2023    Discharge Recommendations: Duncan Mckeon scored a  on the AM-PAC ADL Inpatient form. Current research shows that an AM-PAC score of 17 or less is typically not associated with a discharge to the patient's home setting. Based on the patient's AM-PAC score and their current ADL deficits, it is recommended that the patient have 3-5 sessions per week of Occupational Therapy at d/c to increase the patient's independence. Please see assessment section for further patient specific details. If patient discharges prior to next session this note will serve as a discharge summary. Please see below for the latest assessment towards goals. Subacute/Skilled Nursing Facility  OT Equipment Recommendations  Other: defer to facility       Patient Diagnosis(es): The primary encounter diagnosis was Closed fracture of right ankle, initial encounter. A diagnosis of Bimalleolar ankle fracture, right, closed, initial encounter was also pertinent to this visit. Past Medical History:  has a past medical history of Anxiety, Anxiety, CAD (coronary artery disease), Chronic GERD, Chronic low back pain, Diverticulosis, Foot fracture, GERD (gastroesophageal reflux disease), Hearing loss, Hearing loss, High blood pressure, History of blood transfusion, History of MI (myocardial infarction), HLD (hyperlipidemia), HTN (hypertension), IBS (irritable bowel syndrome), Lumbar radiculopathy, Neuropathy, Spinal stenosis, Type 2 diabetes mellitus ([address]), and UTI (urinary tract infection). Past Surgical History:  has a past surgical history that includes shoulder surgery (Right); Cardiac surgery; Cardiac surgery; other surgical history (N/A, 2016); bladder suspension; Colonoscopy; back surgery (10/31/2016);  Hysterectomy; knee

## 2023-10-12 NOTE — PROGRESS NOTES
Patient admitted to PACU # 13 from OR at 46 post OPEN REDUCTION INTERNAL FIXATION RIGHT ANKLE - Right per Amanda Matos MD.  Attached to PACU monitoring system and report received from anesthesia provider. Patient was reported to be hypertensive during the case. Patient drowsy on admission and denied pain. Pt arrived to PACU with rolled gauze, splint and ace wrap to right foot and ankle. Pt received nerve block immediately prior to arrival to PACU.

## 2023-10-13 LAB
ALBUMIN SERPL-MCNC: 3.6 G/DL (ref 3.4–5)
ALBUMIN/GLOB SERPL: 1.1 {RATIO} (ref 1.1–2.2)
ALP SERPL-CCNC: 55 U/L (ref 40–129)
ALT SERPL-CCNC: 11 U/L (ref 10–40)
ANION GAP SERPL CALCULATED.3IONS-SCNC: 13 MMOL/L (ref 3–16)
AST SERPL-CCNC: 36 U/L (ref 15–37)
BASOPHILS # BLD: 0 K/UL (ref 0–0.2)
BASOPHILS NFR BLD: 0.4 %
BILIRUB SERPL-MCNC: 0.4 MG/DL (ref 0–1)
BUN SERPL-MCNC: 20 MG/DL (ref 7–20)
CALCIUM SERPL-MCNC: 8.9 MG/DL (ref 8.3–10.6)
CHLORIDE SERPL-SCNC: 101 MMOL/L (ref 99–110)
CO2 SERPL-SCNC: 22 MMOL/L (ref 21–32)
CREAT SERPL-MCNC: 1.3 MG/DL (ref 0.6–1.2)
DEPRECATED RDW RBC AUTO: 14.1 % (ref 12.4–15.4)
EOSINOPHIL # BLD: 0 K/UL (ref 0–0.6)
EOSINOPHIL NFR BLD: 0.2 %
GFR SERPLBLD CREATININE-BSD FMLA CKD-EPI: 43 ML/MIN/{1.73_M2}
GLUCOSE BLD-MCNC: 163 MG/DL (ref 70–99)
GLUCOSE BLD-MCNC: 192 MG/DL (ref 70–99)
GLUCOSE BLD-MCNC: 216 MG/DL (ref 70–99)
GLUCOSE BLD-MCNC: 263 MG/DL (ref 70–99)
GLUCOSE SERPL-MCNC: 156 MG/DL (ref 70–99)
HCT VFR BLD AUTO: 27 % (ref 36–48)
HGB BLD-MCNC: 9 G/DL (ref 12–16)
LYMPHOCYTES # BLD: 1.4 K/UL (ref 1–5.1)
LYMPHOCYTES NFR BLD: 14.7 %
MAGNESIUM SERPL-MCNC: 1.8 MG/DL (ref 1.8–2.4)
MCH RBC QN AUTO: 29.5 PG (ref 26–34)
MCHC RBC AUTO-ENTMCNC: 33.4 G/DL (ref 31–36)
MCV RBC AUTO: 88.3 FL (ref 80–100)
MONOCYTES # BLD: 0.8 K/UL (ref 0–1.3)
MONOCYTES NFR BLD: 7.9 %
NEUTROPHILS # BLD: 7.4 K/UL (ref 1.7–7.7)
NEUTROPHILS NFR BLD: 76.8 %
PERFORMED ON: ABNORMAL
PLATELET # BLD AUTO: 221 K/UL (ref 135–450)
PMV BLD AUTO: 7.6 FL (ref 5–10.5)
POTASSIUM SERPL-SCNC: 3.5 MMOL/L (ref 3.5–5.1)
PROT SERPL-MCNC: 6.8 G/DL (ref 6.4–8.2)
RBC # BLD AUTO: 3.05 M/UL (ref 4–5.2)
SODIUM SERPL-SCNC: 136 MMOL/L (ref 136–145)
WBC # BLD AUTO: 9.6 K/UL (ref 4–11)

## 2023-10-13 PROCEDURE — 85025 COMPLETE CBC W/AUTO DIFF WBC: CPT

## 2023-10-13 PROCEDURE — 83735 ASSAY OF MAGNESIUM: CPT

## 2023-10-13 PROCEDURE — 2580000003 HC RX 258: Performed by: ORTHOPAEDIC SURGERY

## 2023-10-13 PROCEDURE — 2580000003 HC RX 258: Performed by: FAMILY MEDICINE

## 2023-10-13 PROCEDURE — 6370000000 HC RX 637 (ALT 250 FOR IP): Performed by: SURGERY

## 2023-10-13 PROCEDURE — 6370000000 HC RX 637 (ALT 250 FOR IP): Performed by: FAMILY MEDICINE

## 2023-10-13 PROCEDURE — 80053 COMPREHEN METABOLIC PANEL: CPT

## 2023-10-13 PROCEDURE — 36415 COLL VENOUS BLD VENIPUNCTURE: CPT

## 2023-10-13 PROCEDURE — 6360000002 HC RX W HCPCS: Performed by: FAMILY MEDICINE

## 2023-10-13 PROCEDURE — 1200000000 HC SEMI PRIVATE

## 2023-10-13 RX ORDER — GABAPENTIN 100 MG/1
100 CAPSULE ORAL 3 TIMES DAILY
Status: DISCONTINUED | OUTPATIENT
Start: 2023-10-13 | End: 2023-10-17 | Stop reason: HOSPADM

## 2023-10-13 RX ORDER — CALCIUM CARBONATE 500 MG/1
500 TABLET, CHEWABLE ORAL 3 TIMES DAILY PRN
Status: DISCONTINUED | OUTPATIENT
Start: 2023-10-13 | End: 2023-10-17 | Stop reason: HOSPADM

## 2023-10-13 RX ORDER — QUETIAPINE FUMARATE 25 MG/1
25 TABLET, FILM COATED ORAL NIGHTLY
Status: DISCONTINUED | OUTPATIENT
Start: 2023-10-13 | End: 2023-10-17 | Stop reason: HOSPADM

## 2023-10-13 RX ORDER — PANTOPRAZOLE SODIUM 40 MG/1
40 TABLET, DELAYED RELEASE ORAL
Status: DISCONTINUED | OUTPATIENT
Start: 2023-10-13 | End: 2023-10-14

## 2023-10-13 RX ADMIN — PANTOPRAZOLE SODIUM 40 MG: 40 TABLET, DELAYED RELEASE ORAL at 16:45

## 2023-10-13 RX ADMIN — ACETAMINOPHEN 650 MG: 325 TABLET ORAL at 15:20

## 2023-10-13 RX ADMIN — ONDANSETRON 4 MG: 2 INJECTION INTRAMUSCULAR; INTRAVENOUS at 20:41

## 2023-10-13 RX ADMIN — ANTACID TABLETS 500 MG: 500 TABLET, CHEWABLE ORAL at 16:45

## 2023-10-13 RX ADMIN — AMLODIPINE BESYLATE 5 MG: 5 TABLET ORAL at 08:35

## 2023-10-13 RX ADMIN — ACETAMINOPHEN 650 MG: 325 TABLET ORAL at 08:35

## 2023-10-13 RX ADMIN — CARVEDILOL 12.5 MG: 12.5 TABLET, FILM COATED ORAL at 08:35

## 2023-10-13 RX ADMIN — CARVEDILOL 12.5 MG: 12.5 TABLET, FILM COATED ORAL at 16:45

## 2023-10-13 RX ADMIN — SODIUM CHLORIDE, PRESERVATIVE FREE 10 ML: 5 INJECTION INTRAVENOUS at 08:35

## 2023-10-13 RX ADMIN — SODIUM CHLORIDE, PRESERVATIVE FREE 10 ML: 5 INJECTION INTRAVENOUS at 21:49

## 2023-10-13 RX ADMIN — GABAPENTIN 100 MG: 100 CAPSULE ORAL at 22:19

## 2023-10-13 RX ADMIN — ENOXAPARIN SODIUM 40 MG: 100 INJECTION SUBCUTANEOUS at 08:35

## 2023-10-13 RX ADMIN — QUETIAPINE FUMARATE 25 MG: 25 TABLET ORAL at 21:49

## 2023-10-13 RX ADMIN — SODIUM CHLORIDE, PRESERVATIVE FREE 20 ML: 5 INJECTION INTRAVENOUS at 21:48

## 2023-10-13 RX ADMIN — INSULIN LISPRO 2 UNITS: 100 INJECTION, SOLUTION INTRAVENOUS; SUBCUTANEOUS at 12:03

## 2023-10-13 ASSESSMENT — PAIN SCALES - WONG BAKER
WONGBAKER_NUMERICALRESPONSE: 0
WONGBAKER_NUMERICALRESPONSE: 0

## 2023-10-13 ASSESSMENT — PAIN DESCRIPTION - ORIENTATION: ORIENTATION: RIGHT

## 2023-10-13 ASSESSMENT — PAIN SCALES - GENERAL
PAINLEVEL_OUTOF10: 0
PAINLEVEL_OUTOF10: 1
PAINLEVEL_OUTOF10: 1
PAINLEVEL_OUTOF10: 0
PAINLEVEL_OUTOF10: 3
PAINLEVEL_OUTOF10: 0
PAINLEVEL_OUTOF10: 0

## 2023-10-13 ASSESSMENT — PAIN DESCRIPTION - PAIN TYPE: TYPE: ACUTE PAIN

## 2023-10-13 ASSESSMENT — PAIN - FUNCTIONAL ASSESSMENT: PAIN_FUNCTIONAL_ASSESSMENT: PREVENTS OR INTERFERES SOME ACTIVE ACTIVITIES AND ADLS

## 2023-10-13 ASSESSMENT — PAIN DESCRIPTION - FREQUENCY: FREQUENCY: INTERMITTENT

## 2023-10-13 ASSESSMENT — PAIN DESCRIPTION - DESCRIPTORS: DESCRIPTORS: ACHING;DISCOMFORT

## 2023-10-13 ASSESSMENT — PAIN DESCRIPTION - LOCATION: LOCATION: ANKLE

## 2023-10-13 ASSESSMENT — PAIN DESCRIPTION - ONSET: ONSET: ON-GOING

## 2023-10-13 NOTE — PLAN OF CARE
Problem: Safety - Adult  Goal: Free from fall injury  10/13/2023 0737 by Dallas Ugalde RN  Outcome: Progressing  10/13/2023 0044 by Keaton Hopson RN  Outcome: Progressing   All fall precautions in place. Bed locked and in lowest position with alarm on. Overbed table and personal belonings within reach. Call light within reach and patient instructed to use call light for assistance. Non-skid socks on. Problem: Discharge Planning  Goal: Discharge to home or other facility with appropriate resources  10/13/2023 0737 by Dallas Ugalde RN  Outcome: Progressing  Flowsheets (Taken 10/13/2023 0358 by Keaton Hopson RN)  Discharge to home or other facility with appropriate resources:   Identify barriers to discharge with patient and caregiver   Arrange for needed discharge resources and transportation as appropriate   Identify discharge learning needs (meds, wound care, etc)  10/13/2023 0044 by Keaton Hopson RN  Outcome: Progressing  Flowsheets  Taken 10/13/2023 0000  Discharge to home or other facility with appropriate resources:   Identify barriers to discharge with patient and caregiver   Arrange for needed discharge resources and transportation as appropriate   Identify discharge learning needs (meds, wound care, etc)  Taken 10/12/2023 2000  Discharge to home or other facility with appropriate resources:   Identify barriers to discharge with patient and caregiver   Arrange for needed discharge resources and transportation as appropriate   Identify discharge learning needs (meds, wound care, etc)   Pt involved in discharge planning. Barriers to discharge discussed with pt. Discharge learning needs identified. Discussed with pt any additional needed resources and transportation plans.

## 2023-10-13 NOTE — DISCHARGE SUMMARY
V2.0  Discharge Summary    Name:  Mozelle Lombard /Age/Sex: 1945 (70 y.o. female)   Admit Date: 10/10/2023  Discharge Date: 10/13/23    MRN & CSN:  [id number] & [id number] Encounter Date and Time 10/13/23 1:05 PM EDT    Attending:  Magdalena Carrera MD Discharging Provider: Magdalena Carrera MD       Discharge Diagnosess:     R bimalleolar fracture requiring ORIF  Acute metabolic encephalopathy  Chronic combined CHF  CAD      Admission HPI, hospital course, and consultants:     Admission HPI:  \"67 y.o. female who presented to [facility] with fall. PMHx significant for DM2 HTN CAD on Plavix. Presents with mechanical fall, states her dog tripped her, did hit head no LOC. As she fell twisted R ankle, instant pain, difficult to ambulate. Denies chest pain/pressure, abd pain, head ache, dizziness, numbness tingling     In ER  Did not obtain head ct     XR R ankle showed Posterior dislocation of the talus in respect to the tibial plafond./Displaced bimalleolar fractures. XR R Tib/fib showed Posterior dislocation of the talus in respect to the tibial plafond./Displaced bimalleolar fractures. Cr 1.5  Glucose 198\"    Hospital course:  Pateint had uncomplicated ORIF to R ankle. She did have some post-op delirium but this resolved by AM. Final discharge disposition to be guided by PT evalauation. The patient expressed appropriate understanding of, and agreement with the discharge recommendations, medications, and plan. Consults this admission:  IP CONSULT TO ORTHOPEDIC SURGERY  IP CONSULT TO CARDIOLOGY    Discharge Instructions:    Follow up appointments: orthopedic surgery  Primary care physician: Agustina Boyer within 2 weeks  Diet: regular diet   Activity: NWB RLE 6 weeks  Disposition: Discharged to:   []Home, []HHC, []SNF, []Acute Rehab, []Hospice   Condition on discharge: Stable  Labs and Tests to be Followed up as an outpatient by PCP or Specialist: none    Discharge Medications: reduction FINDINGS:  Three views of the right  ankle were obtained. Comparison is made to radiographs of the right ankle dated 10/10/2023. Previously seen dislocated talus has been reduced. Acute transverse fracture through the medial malleolus alignment is improved. Oblique fracture through the distal fibula alignment is improved. There is a coronally oriented fracture extending through the posterior malleolus of the tibial plafond. This could be confirmed with CT. 1. As above. XR ANKLE RIGHT (MIN 3 VIEWS)    Result Date: 10/10/2023  RIGHT ANKLE 3 VIEWS CLINICAL HISTORY: Pain. fracture FINDINGS:  Three views of the right  ankle were obtained. There is posterior dislocation of the talus in respect to the tibial plafond. There is a posteriorly displaced transverse fracture identified through the base of the medial malleolus. There is is an oblique posterior displaced fracture extending through the distal fibular metaphysis. No other fractures are identified. There is atherosclerotic calcification of the posterior tibial artery. 1. Posterior dislocation of the talus in respect to the tibial plafond. 2. Displaced bimalleolar fractures. RIGHT TIBIA AND FIBULA 2 VIEWS CLINICAL HISTORY: Pain. FINDINGS:  AP and lateral views of the right tibia and fibula were obtained. There are displaced bimalleolar fractures. No other tibial or fibular fracture is identified. Surgical clips are identified within the soft tissues of the proximal and distal calf. Correlate with prior vascular surgery history. IMPRESSION:  1. Posterior dislocation of the talus in respect to the tibial plafond. 2. Displaced bimalleolar fractures. XR TIBIA FIBULA RIGHT (2 VIEWS)    Result Date: 10/10/2023  RIGHT ANKLE 3 VIEWS CLINICAL HISTORY: Pain. fracture FINDINGS:  Three views of the right  ankle were obtained. There is posterior dislocation of the talus in respect to the tibial plafond.  There is a posteriorly displaced transverse fracture

## 2023-10-13 NOTE — PROGRESS NOTES
Pt a/o x4, has intermittent confusion at times, vss on room air. Pt voiding adequately via BRP. Pt up to chair this shift and bathroom using a x2 assist and a stedy with gaitbelt, pt tolerated activity well. Pt had multiple BM this shift. Pt pain being managed per MAR. Pt family at the bedside, son to stay the night. All fall precautions are in place, call light within reach.

## 2023-10-13 NOTE — PLAN OF CARE
Problem: Discharge Planning  Goal: Discharge to home or other facility with appropriate resources  10/13/2023 1952 by Keaton Hopson RN  Outcome: Progressing  Note: CM is following for discharge. Problem: Pain  Goal: Verbalizes/displays adequate comfort level or baseline comfort level  10/13/2023 1952 by Keaton Hopson RN  Outcome: Progressing  Note: Numeric pain rating scale used for assessment. Problem: Skin/Tissue Integrity  Goal: Absence of new skin breakdown  Description: 1. Monitor for areas of redness and/or skin breakdown  2. Assess vascular access sites hourly  3. Every 4-6 hours minimum:  Change oxygen saturation probe site  4. Every 4-6 hours:  If on nasal continuous positive airway pressure, respiratory therapy assess nares and determine need for appliance change or resting period. 10/13/2023 1952 by Keaton Hopson RN  Outcome: Progressing  Note: All needed measures were taken to prevent from skin breakdown. Problem: ABCDS Injury Assessment  Goal: Absence of physical injury  10/13/2023 1952 by Keaton Hopson RN  Outcome: Progressing  Note: Pt free from fall injury. Problem: Safety - Adult  Goal: Free from fall injury  10/13/2023 1952 by Keaton Hopson RN  Outcome: Progressing  Note: All fall precautions in place, call light within reach, free from fall injury.

## 2023-10-13 NOTE — PLAN OF CARE
Problem: Discharge Planning  Goal: Discharge to home or other facility with appropriate resources  10/13/2023 0044 by Noar Marshall RN  Outcome: Progressing  Note: CM is following for discharge. Flowsheets  Taken 10/13/2023 0000  Discharge to home or other facility with appropriate resources:   Identify barriers to discharge with patient and caregiver   Arrange for needed discharge resources and transportation as appropriate   Identify discharge learning needs (meds, wound care, etc)  Taken 10/12/2023 2000  Discharge to home or other facility with appropriate resources:   Identify barriers to discharge with patient and caregiver   Arrange for needed discharge resources and transportation as appropriate   Identify discharge learning needs (meds, wound care, etc)     Problem: Skin/Tissue Integrity  Goal: Absence of new skin breakdown  Description: 1. Monitor for areas of redness and/or skin breakdown  2. Assess vascular access sites hourly  3. Every 4-6 hours minimum:  Change oxygen saturation probe site  4. Every 4-6 hours:  If on nasal continuous positive airway pressure, respiratory therapy assess nares and determine need for appliance change or resting period. 10/13/2023 0044 by Nora Marshall RN  Outcome: Progressing  Note: All needed measures were taken to prevent from skin breakdown. Problem: ABCDS Injury Assessment  Goal: Absence of physical injury  10/13/2023 0044 by Nora Marshall RN  Outcome: Progressing  Note: Pt free from physical injury. Problem: Safety - Adult  Goal: Free from fall injury  10/13/2023 0044 by Nora Marshall RN  Outcome: Progressing  Note: All fall precautions in place, call light within reach, free from fall injury. Problem: Pain  Goal: Verbalizes/displays adequate comfort level or baseline comfort level  10/13/2023 0044 by Nora Marshall RN  Outcome: Progressing  Note: Numeric pain rating scale used for assessment.

## 2023-10-13 NOTE — PROGRESS NOTES
Pt is alert and oriented X4 but disoriented intermittently, trying to get out of bed and pull tubes. Re- orientation given. VS taken and recorded. Spo2 maintained with O2 inhalation at 2 L/M. Is on oral diet and tolerating well. Pt voiding adequately with pure wick. No BM during this shift. Pt unable to follow commands for ambulation. All fall precautions in place, call light within reach, free from fall injury. Plan of care ongoing.

## 2023-10-14 LAB
ALBUMIN SERPL-MCNC: 3.5 G/DL (ref 3.4–5)
ALBUMIN/GLOB SERPL: 1.1 {RATIO} (ref 1.1–2.2)
ALP SERPL-CCNC: 58 U/L (ref 40–129)
ALT SERPL-CCNC: 12 U/L (ref 10–40)
ANION GAP SERPL CALCULATED.3IONS-SCNC: 14 MMOL/L (ref 3–16)
AST SERPL-CCNC: 30 U/L (ref 15–37)
BASOPHILS # BLD: 0 K/UL (ref 0–0.2)
BASOPHILS NFR BLD: 0.2 %
BILIRUB SERPL-MCNC: 0.4 MG/DL (ref 0–1)
BUN SERPL-MCNC: 15 MG/DL (ref 7–20)
CALCIUM SERPL-MCNC: 8.9 MG/DL (ref 8.3–10.6)
CHLORIDE SERPL-SCNC: 99 MMOL/L (ref 99–110)
CO2 SERPL-SCNC: 22 MMOL/L (ref 21–32)
CREAT SERPL-MCNC: 1 MG/DL (ref 0.6–1.2)
DEPRECATED RDW RBC AUTO: 13.8 % (ref 12.4–15.4)
EOSINOPHIL # BLD: 0.2 K/UL (ref 0–0.6)
EOSINOPHIL NFR BLD: 2.4 %
GFR SERPLBLD CREATININE-BSD FMLA CKD-EPI: 58 ML/MIN/{1.73_M2}
GLUCOSE BLD-MCNC: 181 MG/DL (ref 70–99)
GLUCOSE BLD-MCNC: 183 MG/DL (ref 70–99)
GLUCOSE BLD-MCNC: 200 MG/DL (ref 70–99)
GLUCOSE BLD-MCNC: 225 MG/DL (ref 70–99)
GLUCOSE SERPL-MCNC: 211 MG/DL (ref 70–99)
HCT VFR BLD AUTO: 26.9 % (ref 36–48)
HGB BLD-MCNC: 9.3 G/DL (ref 12–16)
LYMPHOCYTES # BLD: 1.1 K/UL (ref 1–5.1)
LYMPHOCYTES NFR BLD: 12.2 %
MAGNESIUM SERPL-MCNC: 1.8 MG/DL (ref 1.8–2.4)
MCH RBC QN AUTO: 29.7 PG (ref 26–34)
MCHC RBC AUTO-ENTMCNC: 34.6 G/DL (ref 31–36)
MCV RBC AUTO: 85.8 FL (ref 80–100)
MONOCYTES # BLD: 0.6 K/UL (ref 0–1.3)
MONOCYTES NFR BLD: 7.1 %
NEUTROPHILS # BLD: 7 K/UL (ref 1.7–7.7)
NEUTROPHILS NFR BLD: 78.1 %
PERFORMED ON: ABNORMAL
PLATELET # BLD AUTO: 230 K/UL (ref 135–450)
PMV BLD AUTO: 7.8 FL (ref 5–10.5)
POTASSIUM SERPL-SCNC: 3.3 MMOL/L (ref 3.5–5.1)
PROT SERPL-MCNC: 6.8 G/DL (ref 6.4–8.2)
RBC # BLD AUTO: 3.13 M/UL (ref 4–5.2)
SODIUM SERPL-SCNC: 135 MMOL/L (ref 136–145)
WBC # BLD AUTO: 8.9 K/UL (ref 4–11)

## 2023-10-14 PROCEDURE — 83735 ASSAY OF MAGNESIUM: CPT

## 2023-10-14 PROCEDURE — 6370000000 HC RX 637 (ALT 250 FOR IP): Performed by: SURGERY

## 2023-10-14 PROCEDURE — 97535 SELF CARE MNGMENT TRAINING: CPT

## 2023-10-14 PROCEDURE — 2580000003 HC RX 258: Performed by: FAMILY MEDICINE

## 2023-10-14 PROCEDURE — 97530 THERAPEUTIC ACTIVITIES: CPT

## 2023-10-14 PROCEDURE — 6360000002 HC RX W HCPCS: Performed by: FAMILY MEDICINE

## 2023-10-14 PROCEDURE — 80053 COMPREHEN METABOLIC PANEL: CPT

## 2023-10-14 PROCEDURE — 1200000000 HC SEMI PRIVATE

## 2023-10-14 PROCEDURE — 97110 THERAPEUTIC EXERCISES: CPT

## 2023-10-14 PROCEDURE — 85025 COMPLETE CBC W/AUTO DIFF WBC: CPT

## 2023-10-14 PROCEDURE — 36415 COLL VENOUS BLD VENIPUNCTURE: CPT

## 2023-10-14 PROCEDURE — 2580000003 HC RX 258: Performed by: ORTHOPAEDIC SURGERY

## 2023-10-14 PROCEDURE — 6370000000 HC RX 637 (ALT 250 FOR IP): Performed by: FAMILY MEDICINE

## 2023-10-14 RX ORDER — MAGNESIUM SULFATE IN WATER 40 MG/ML
2000 INJECTION, SOLUTION INTRAVENOUS PRN
Status: DISCONTINUED | OUTPATIENT
Start: 2023-10-14 | End: 2023-10-17 | Stop reason: HOSPADM

## 2023-10-14 RX ORDER — PANTOPRAZOLE SODIUM 40 MG/1
40 TABLET, DELAYED RELEASE ORAL
Status: DISCONTINUED | OUTPATIENT
Start: 2023-10-15 | End: 2023-10-17 | Stop reason: HOSPADM

## 2023-10-14 RX ORDER — POTASSIUM CHLORIDE 20 MEQ/1
40 TABLET, EXTENDED RELEASE ORAL ONCE
Status: COMPLETED | OUTPATIENT
Start: 2023-10-14 | End: 2023-10-14

## 2023-10-14 RX ORDER — POTASSIUM CHLORIDE 20 MEQ/1
40 TABLET, EXTENDED RELEASE ORAL PRN
Status: DISCONTINUED | OUTPATIENT
Start: 2023-10-14 | End: 2023-10-17 | Stop reason: HOSPADM

## 2023-10-14 RX ORDER — HYDRALAZINE HYDROCHLORIDE 20 MG/ML
10 INJECTION INTRAMUSCULAR; INTRAVENOUS EVERY 6 HOURS PRN
Status: DISCONTINUED | OUTPATIENT
Start: 2023-10-14 | End: 2023-10-17 | Stop reason: HOSPADM

## 2023-10-14 RX ORDER — CLOPIDOGREL BISULFATE 75 MG/1
75 TABLET ORAL DAILY
Status: DISCONTINUED | OUTPATIENT
Start: 2023-10-14 | End: 2023-10-17 | Stop reason: HOSPADM

## 2023-10-14 RX ORDER — SERTRALINE HYDROCHLORIDE 100 MG/1
100 TABLET, FILM COATED ORAL NIGHTLY
Status: DISCONTINUED | OUTPATIENT
Start: 2023-10-14 | End: 2023-10-17 | Stop reason: HOSPADM

## 2023-10-14 RX ORDER — CLOPIDOGREL BISULFATE 75 MG/1
75 TABLET ORAL DAILY
Status: DISCONTINUED | OUTPATIENT
Start: 2023-10-14 | End: 2023-10-14

## 2023-10-14 RX ORDER — POTASSIUM CHLORIDE 7.45 MG/ML
10 INJECTION INTRAVENOUS PRN
Status: DISCONTINUED | OUTPATIENT
Start: 2023-10-14 | End: 2023-10-17 | Stop reason: HOSPADM

## 2023-10-14 RX ADMIN — CLOPIDOGREL BISULFATE 75 MG: 75 TABLET ORAL at 15:02

## 2023-10-14 RX ADMIN — AMLODIPINE BESYLATE 5 MG: 5 TABLET ORAL at 08:15

## 2023-10-14 RX ADMIN — GABAPENTIN 100 MG: 100 CAPSULE ORAL at 20:12

## 2023-10-14 RX ADMIN — QUETIAPINE FUMARATE 25 MG: 25 TABLET ORAL at 20:13

## 2023-10-14 RX ADMIN — POTASSIUM CHLORIDE 40 MEQ: 1500 TABLET, EXTENDED RELEASE ORAL at 12:24

## 2023-10-14 RX ADMIN — SODIUM CHLORIDE, PRESERVATIVE FREE 10 ML: 5 INJECTION INTRAVENOUS at 20:16

## 2023-10-14 RX ADMIN — INSULIN LISPRO 1 UNITS: 100 INJECTION, SOLUTION INTRAVENOUS; SUBCUTANEOUS at 12:10

## 2023-10-14 RX ADMIN — ONDANSETRON 4 MG: 2 INJECTION INTRAMUSCULAR; INTRAVENOUS at 17:50

## 2023-10-14 RX ADMIN — SODIUM CHLORIDE, PRESERVATIVE FREE 10 ML: 5 INJECTION INTRAVENOUS at 08:19

## 2023-10-14 RX ADMIN — CARVEDILOL 12.5 MG: 12.5 TABLET, FILM COATED ORAL at 16:29

## 2023-10-14 RX ADMIN — ENOXAPARIN SODIUM 40 MG: 100 INJECTION SUBCUTANEOUS at 08:15

## 2023-10-14 RX ADMIN — PANTOPRAZOLE SODIUM 40 MG: 40 TABLET, DELAYED RELEASE ORAL at 05:54

## 2023-10-14 RX ADMIN — GABAPENTIN 100 MG: 100 CAPSULE ORAL at 15:02

## 2023-10-14 RX ADMIN — SODIUM CHLORIDE, PRESERVATIVE FREE 10 ML: 5 INJECTION INTRAVENOUS at 20:15

## 2023-10-14 RX ADMIN — CARVEDILOL 12.5 MG: 12.5 TABLET, FILM COATED ORAL at 08:15

## 2023-10-14 RX ADMIN — GABAPENTIN 100 MG: 100 CAPSULE ORAL at 08:15

## 2023-10-14 RX ADMIN — INSULIN LISPRO 1 UNITS: 100 INJECTION, SOLUTION INTRAVENOUS; SUBCUTANEOUS at 08:15

## 2023-10-14 RX ADMIN — SERTRALINE HYDROCHLORIDE 100 MG: 100 TABLET ORAL at 20:13

## 2023-10-14 ASSESSMENT — PAIN SCALES - GENERAL
PAINLEVEL_OUTOF10: 0
PAINLEVEL_OUTOF10: 0

## 2023-10-14 NOTE — PROGRESS NOTES
Occupational Therapy  Facility/Department: [address] [place], [place] [address] 5T ORTHO/NEURO  Daily Treatment Note  NAME: Milton Chi  : 1945  MRN: [id number]    Date of Service: 10/14/2023    Discharge Recommendations: Milton Chi scored a 15/24 on the AM-PAC ADL Inpatient form. Current research shows that an AM-PAC score of 17 or less is typically not associated with a discharge to the patient's home setting. Based on the patient's AM-PAC score and their current ADL deficits, it is recommended that the patient have 3-5 sessions per week of Occupational Therapy at d/c to increase the patient's independence. Please see assessment section for further patient specific details. If patient discharges prior to next session this note will serve as a discharge summary. Please see below for the latest assessment towards goals. Subacute/Skilled Nursing Facility  OT Equipment Recommendations  Other: defer to facility      Patient Diagnosis(es): The primary encounter diagnosis was Closed fracture of right ankle, initial encounter. A diagnosis of Bimalleolar ankle fracture, right, closed, initial encounter was also pertinent to this visit. Assessment    Assessment: PT is 68y F from home w/ family. Pt is POD3 R ankle ORIF and NWB RLE. Pt is IND at baseline, and presently requiring MaxA for LB ADLs and physical assist of 1-2 for fxl mobilty tasks. Pt may benefit from contd skilled OT while inpt, and aafter acute d/c before retn home, to [facility] safety, IND and fxl activity tolerance for ADL and fxl mobilty. Will cont to follow as inpt per POC  Activity Tolerance: Patient limited by endurance; Patient limited by fatigue;Patient tolerated evaluation without incident;Treatment limited secondary to medical complications  Other: defer to facility      Plan   Occupational Therapy Plan  Times Per Week: 5-7x  Times Per Day:  Once a day  Current Treatment Recommendations: Strengthening;Balance training;Functional mobility

## 2023-10-14 NOTE — PROGRESS NOTES
Pt A&Ox4 with intermittent confusion noted. Delirium precautions in place. VSS with exception to elevated BP. Pt denies pain throughout shift. No acute events noted. RLE wrapped in splint and pt remains NWB to that extremity. Pt is noted to have decreased appetite this shift- this RN frequently encouraging PO intake. Tolerating well, denies nausea. Pt is voiding adequately per BRP, having one BM this shift. Pt worked with therapy and spent afternoon in chair. Pt tolerating ambulation x2 with stedy for long distances. Pt currently resting in bed with all fall and safety precautions in place, bed locked and in lowest position, call light and belongings within reach. Pt denies further needs at this time. Family at bedside.

## 2023-10-14 NOTE — PROGRESS NOTES
rail  Scooting: Min assist x 1 to EOB    Transfers  Sit to stand: Mod assist x 1 (+ CGA of another for safety) from bed to walker; Mod assist x 1 from recliner to Bee Has; Dependent (Mod assist x 2) from commode to [address] to sit: Mod assist x 1 into recliner (twice); Mod assist x 1 onto commode. Cues for hand placement and controlled descent. Bed to chair: Pt able to pivot with wheeled walker Mod assist x 1 (+ CGA for safety)  Other: Cues/reminders for NWB R LE with transfers. Exercises  While reclined in chair:  10 reps L ankle pumps, B hip adductor squeezes (with pillow), SAQ (min assist on R), hip abd/add (min assist on R)    Balance  Sat EOB with SBA  Static stance with walker Min assist x 1  Pivoting with walker to chair Mod assist x 1    Patient Education  Role of PT. Calling for assist with needs. Expressed understanding. NWB R LE with transfers/standing. Demonstrated good understanding with cueing. Safety Devices  Pt left with needs in reach. In chair (reclined) with chair alarm on. RN updated.      AM-PAC score  AM-PAC Inpatient Mobility Raw Score : 11  AM-PAC Inpatient T-Scale Score : 33.86  Mobility Inpatient CMS 0-100% Score: 72.57  Mobility Inpatient CMS G-Code Modifier : CL    Goals: (as determined and assessed by primary PT)  Time Frame for Short Term Goals: by d/c  Short Term Goal 1: Pt will complete bed mobility with mod I   Short Term Goal 2: Pt will complete sit to stand transfers with LRAD and min x2 with maintenance of WB status   Short Term Goal 3: Pt will complete bed to chair transfers with LRAD and min x2  Short Term Goal 4: Pt will ambulate 5' with LRAD and min x2       Plan:  Plan: 5-7 times per week  Current Treatment Recommendations: Strengthening, ROM, Balance training, Functional mobility training, Transfer training, Gait training, Stair training, Safety education & training, Patient/Caregiver education & training, Equipment evaluation, education, & procurement,

## 2023-10-14 NOTE — DISCHARGE SUMMARY
V2.0  Discharge Summary    Name:  Maria Antonia MORTON/Age/Sex: 1945 (70 y.o. female)   Admit Date: 10/10/2023  Discharge Date: 10/14/23    MRN & CSN:  [id number] & [id number] Encounter Date and Time 10/14/23 1:05 PM EDT    Attending:  Charu Hammond MD Discharging Provider: Charu Hammond MD       Discharge Diagnosess:     R bimalleolar fracture requiring ORIF  Acute metabolic encephalopathy  Chronic combined CHF  CAD      Admission HPI, hospital course, and consultants:     Admission HPI:  \"67 y.o. female who presented to [facility] with fall. PMHx significant for DM2 HTN CAD on Plavix. Presents with mechanical fall, states her dog tripped her, did hit head no LOC. As she fell twisted R ankle, instant pain, difficult to ambulate. Denies chest pain/pressure, abd pain, head ache, dizziness, numbness tingling     In ER  Did not obtain head ct     XR R ankle showed Posterior dislocation of the talus in respect to the tibial plafond./Displaced bimalleolar fractures. XR R Tib/fib showed Posterior dislocation of the talus in respect to the tibial plafond./Displaced bimalleolar fractures. Cr 1.5  Glucose 198\"    Hospital course:  Pateint had uncomplicated ORIF to R ankle. She did have some post-op delirium but this resolved by AM. Final discharge disposition to be guided by PT evalauation. The patient expressed appropriate understanding of, and agreement with the discharge recommendations, medications, and plan. Consults this admission:  IP CONSULT TO ORTHOPEDIC SURGERY  IP CONSULT TO CARDIOLOGY    Discharge Instructions:    Follow up appointments: orthopedic surgery  Primary care physician: Rachana James within 2 weeks  Diet: regular diet   Activity: NWB RLE 6 weeks  Disposition: Discharged to:   []Home, []HHC, []SNF, []Acute Rehab, []Hospice   Condition on discharge: Stable  Labs and Tests to be Followed up as an outpatient by PCP or Specialist: none    Discharge Medications: reduction FINDINGS:  Three views of the right  ankle were obtained. Comparison is made to radiographs of the right ankle dated 10/10/2023. Previously seen dislocated talus has been reduced. Acute transverse fracture through the medial malleolus alignment is improved. Oblique fracture through the distal fibula alignment is improved. There is a coronally oriented fracture extending through the posterior malleolus of the tibial plafond. This could be confirmed with CT. 1. As above. XR ANKLE RIGHT (MIN 3 VIEWS)    Result Date: 10/10/2023  RIGHT ANKLE 3 VIEWS CLINICAL HISTORY: Pain. fracture FINDINGS:  Three views of the right  ankle were obtained. There is posterior dislocation of the talus in respect to the tibial plafond. There is a posteriorly displaced transverse fracture identified through the base of the medial malleolus. There is is an oblique posterior displaced fracture extending through the distal fibular metaphysis. No other fractures are identified. There is atherosclerotic calcification of the posterior tibial artery. 1. Posterior dislocation of the talus in respect to the tibial plafond. 2. Displaced bimalleolar fractures. RIGHT TIBIA AND FIBULA 2 VIEWS CLINICAL HISTORY: Pain. FINDINGS:  AP and lateral views of the right tibia and fibula were obtained. There are displaced bimalleolar fractures. No other tibial or fibular fracture is identified. Surgical clips are identified within the soft tissues of the proximal and distal calf. Correlate with prior vascular surgery history. IMPRESSION:  1. Posterior dislocation of the talus in respect to the tibial plafond. 2. Displaced bimalleolar fractures. XR TIBIA FIBULA RIGHT (2 VIEWS)    Result Date: 10/10/2023  RIGHT ANKLE 3 VIEWS CLINICAL HISTORY: Pain. fracture FINDINGS:  Three views of the right  ankle were obtained. There is posterior dislocation of the talus in respect to the tibial plafond.  There is a posteriorly displaced transverse fracture

## 2023-10-14 NOTE — PROGRESS NOTES
Pt feeling nausea so PRN Zofran was given. Pt feeling better now but SPO2 was 89% so kept her on O2 inhalation at 2 L/M and Spo2 increased to 95%. [facility] notified.

## 2023-10-14 NOTE — PLAN OF CARE
Problem: Discharge Planning  Goal: Discharge to home or other facility with appropriate resources  Outcome: Progressing  Note: CM is following for discharge. Flowsheets (Taken 10/14/2023 1951)  Discharge to home or other facility with appropriate resources:   Identify barriers to discharge with patient and caregiver   Arrange for needed discharge resources and transportation as appropriate   Identify discharge learning needs (meds, wound care, etc)     Problem: Pain  Goal: Verbalizes/displays adequate comfort level or baseline comfort level  10/14/2023 1951 by Junito Duvall RN  Outcome: Progressing  Note: Numeric pain rating scale used for assessment. Pain managed with MAR. Flowsheets (Taken 10/14/2023 1951)  Verbalizes/displays adequate comfort level or baseline comfort level:   Encourage patient to monitor pain and request assistance   Assess pain using appropriate pain scale   Administer analgesics based on type and severity of pain and evaluate response   Implement non-pharmacological measures as appropriate and evaluate response     Problem: Skin/Tissue Integrity  Goal: Absence of new skin breakdown  Description: 1. Monitor for areas of redness and/or skin breakdown  2. Assess vascular access sites hourly  3. Every 4-6 hours minimum:  Change oxygen saturation probe site  4. Every 4-6 hours:  If on nasal continuous positive airway pressure, respiratory therapy assess nares and determine need for appliance change or resting period. Outcome: Progressing  Note: All needed measures were taken to prevent from skin breakdown. Problem: ABCDS Injury Assessment  Goal: Absence of physical injury  Outcome: Progressing  Note: Pt free from fall injury.    Flowsheets (Taken 10/14/2023 1951)  Absence of Physical Injury: Implement safety measures based on patient assessment

## 2023-10-14 NOTE — PLAN OF CARE
Problem: Pain  Goal: Verbalizes/displays adequate comfort level or baseline comfort level  10/14/2023 0727 by Sylvia Fofana RN  Outcome: Progressing  Pt endorsing pain to R ankle. Being treated with PRN pain medication, rest, and frequent repositioning with pillow support for comfort and pressure relief. Pt reports some relief from pain with above interventions. Problem: Safety - Adult  Goal: Free from fall injury  10/14/2023 0727 by Sylvia Fofana RN  Outcome: Progressing  All fall precautions in place. Bed locked and in lowest position with alarm on. Overbed table and personal belonings within reach. Call light within reach and patient instructed to use call light for assistance. Non-skid socks on.

## 2023-10-14 NOTE — PROGRESS NOTES
Pt alert and oriented X4 but intermittently disoriented so re- orientation given. VS taken and recorded. SpO2 maintained with O2 at 2 L/M. Pt denies pain. NO any acute changes this shift. Is on oral diet and tolerating well. Pt voiding adequately with BRP. No BM during this shift. All fall precautions in place, call light within reach, free from fall injury. Son at the bedside. Plan of care ongoing.

## 2023-10-14 NOTE — PROGRESS NOTES
Physician Progress Note      Abhilash Duvall  CSN #:                  [id number]  :                       1945  ADMIT DATE:       10/10/2023 8:42 PM  [address] [facility] DATE:  RESPONDING  PROVIDER #:        Adelso Devine MD          QUERY TEXT:    Pt admitted with Ankle fx, s/p ORIF 10/11. Pt noted to have intermittent   confusion since surgery, Metabolic encephalopathy documented in d/c sum as   well as post-op delirium. If possible, please document if you are treating the   following: The medical record reflects the following:  Risk Factors: 78 y.o.f. s/p ORIF  Clinical Indicators: PACU 10/11 \"delayed responses, not fully alert after   surgery. febrile with temp of 101.5.  1000 mg Tylenol ordered. Son states pt   becomes confused and \"off\" anytime she receives Dilaudid and/or has a UTI\".   10/11 UA Neg, CX not indicated. Per RN 10/13 \"oriented X4 but disoriented   intermittently, trying to get out of bed and pull tubes\". D/C SUM: \"Acute   metabolic encephalopathy. Did have some post-op delirium but this resolved by   AM\". Last dose Dilaudid given 10/11 @ 0833, remained confused day of d/c. Treatment: UA, Dilaudid d/c'd 10/12, reorientation  Options provided:  -- Postop delirium confirmed, Metabolic encephalopathy ruled out  -- Metabolic encephalopathy due to Dilaudid, resolved  -- Metabolic encephalopathy due to, Please specify metabolic process causing   encephalopathy  -- Other - I will add my own diagnosis  -- Disagree - Not applicable / Not valid  -- Disagree - Clinically unable to determine / Unknown  -- Refer to Clinical Documentation Reviewer    PROVIDER RESPONSE TEXT:    Postop delirium confirmed, Metabolic encephalopathy ruled out    Query created by:  Kendrick Jasso on 10/13/2023 3:03 PM      Electronically signed by:  Adelso Devnie MD 10/14/2023 11:45 AM

## 2023-10-15 LAB
ALBUMIN SERPL-MCNC: 3.3 G/DL (ref 3.4–5)
ALBUMIN/GLOB SERPL: 0.9 {RATIO} (ref 1.1–2.2)
ALP SERPL-CCNC: 64 U/L (ref 40–129)
ALT SERPL-CCNC: 13 U/L (ref 10–40)
ANION GAP SERPL CALCULATED.3IONS-SCNC: 10 MMOL/L (ref 3–16)
AST SERPL-CCNC: 25 U/L (ref 15–37)
BASOPHILS # BLD: 0 K/UL (ref 0–0.2)
BASOPHILS NFR BLD: 0.3 %
BILIRUB SERPL-MCNC: 0.4 MG/DL (ref 0–1)
BUN SERPL-MCNC: 13 MG/DL (ref 7–20)
CALCIUM SERPL-MCNC: 8.8 MG/DL (ref 8.3–10.6)
CHLORIDE SERPL-SCNC: 98 MMOL/L (ref 99–110)
CO2 SERPL-SCNC: 23 MMOL/L (ref 21–32)
CREAT SERPL-MCNC: 0.9 MG/DL (ref 0.6–1.2)
DEPRECATED RDW RBC AUTO: 13.9 % (ref 12.4–15.4)
EOSINOPHIL # BLD: 0.2 K/UL (ref 0–0.6)
EOSINOPHIL NFR BLD: 2.7 %
GFR SERPLBLD CREATININE-BSD FMLA CKD-EPI: >60 ML/MIN/{1.73_M2}
GLUCOSE BLD-MCNC: 176 MG/DL (ref 70–99)
GLUCOSE BLD-MCNC: 195 MG/DL (ref 70–99)
GLUCOSE BLD-MCNC: 221 MG/DL (ref 70–99)
GLUCOSE BLD-MCNC: 241 MG/DL (ref 70–99)
GLUCOSE SERPL-MCNC: 201 MG/DL (ref 70–99)
HCT VFR BLD AUTO: 26.7 % (ref 36–48)
HGB BLD-MCNC: 9 G/DL (ref 12–16)
LYMPHOCYTES # BLD: 1.2 K/UL (ref 1–5.1)
LYMPHOCYTES NFR BLD: 13.7 %
MAGNESIUM SERPL-MCNC: 1.8 MG/DL (ref 1.8–2.4)
MCH RBC QN AUTO: 29.4 PG (ref 26–34)
MCHC RBC AUTO-ENTMCNC: 33.8 G/DL (ref 31–36)
MCV RBC AUTO: 86.8 FL (ref 80–100)
MONOCYTES # BLD: 0.7 K/UL (ref 0–1.3)
MONOCYTES NFR BLD: 7.9 %
NEUTROPHILS # BLD: 6.5 K/UL (ref 1.7–7.7)
NEUTROPHILS NFR BLD: 75.4 %
PERFORMED ON: ABNORMAL
PLATELET # BLD AUTO: 250 K/UL (ref 135–450)
PMV BLD AUTO: 7.4 FL (ref 5–10.5)
POTASSIUM SERPL-SCNC: 3.3 MMOL/L (ref 3.5–5.1)
PROT SERPL-MCNC: 6.8 G/DL (ref 6.4–8.2)
RBC # BLD AUTO: 3.08 M/UL (ref 4–5.2)
SODIUM SERPL-SCNC: 131 MMOL/L (ref 136–145)
WBC # BLD AUTO: 8.6 K/UL (ref 4–11)

## 2023-10-15 PROCEDURE — 83735 ASSAY OF MAGNESIUM: CPT

## 2023-10-15 PROCEDURE — 85025 COMPLETE CBC W/AUTO DIFF WBC: CPT

## 2023-10-15 PROCEDURE — 6370000000 HC RX 637 (ALT 250 FOR IP): Performed by: FAMILY MEDICINE

## 2023-10-15 PROCEDURE — 1200000000 HC SEMI PRIVATE

## 2023-10-15 PROCEDURE — 2580000003 HC RX 258: Performed by: FAMILY MEDICINE

## 2023-10-15 PROCEDURE — 36415 COLL VENOUS BLD VENIPUNCTURE: CPT

## 2023-10-15 PROCEDURE — 80053 COMPREHEN METABOLIC PANEL: CPT

## 2023-10-15 PROCEDURE — 2580000003 HC RX 258: Performed by: ORTHOPAEDIC SURGERY

## 2023-10-15 PROCEDURE — 6360000002 HC RX W HCPCS: Performed by: FAMILY MEDICINE

## 2023-10-15 PROCEDURE — 6360000002 HC RX W HCPCS: Performed by: SURGERY

## 2023-10-15 PROCEDURE — 6370000000 HC RX 637 (ALT 250 FOR IP): Performed by: SURGERY

## 2023-10-15 RX ADMIN — INSULIN LISPRO 1 UNITS: 100 INJECTION, SOLUTION INTRAVENOUS; SUBCUTANEOUS at 12:33

## 2023-10-15 RX ADMIN — ANTACID TABLETS 500 MG: 500 TABLET, CHEWABLE ORAL at 19:56

## 2023-10-15 RX ADMIN — PANTOPRAZOLE SODIUM 40 MG: 40 TABLET, DELAYED RELEASE ORAL at 05:58

## 2023-10-15 RX ADMIN — SODIUM CHLORIDE, PRESERVATIVE FREE 10 ML: 5 INJECTION INTRAVENOUS at 08:44

## 2023-10-15 RX ADMIN — AMLODIPINE BESYLATE 5 MG: 5 TABLET ORAL at 08:44

## 2023-10-15 RX ADMIN — HYDRALAZINE HYDROCHLORIDE 10 MG: 20 INJECTION, SOLUTION INTRAMUSCULAR; INTRAVENOUS at 01:53

## 2023-10-15 RX ADMIN — SERTRALINE HYDROCHLORIDE 100 MG: 100 TABLET ORAL at 19:36

## 2023-10-15 RX ADMIN — QUETIAPINE FUMARATE 25 MG: 25 TABLET ORAL at 19:36

## 2023-10-15 RX ADMIN — CLOPIDOGREL BISULFATE 75 MG: 75 TABLET ORAL at 08:39

## 2023-10-15 RX ADMIN — CARVEDILOL 12.5 MG: 12.5 TABLET, FILM COATED ORAL at 08:39

## 2023-10-15 RX ADMIN — GABAPENTIN 100 MG: 100 CAPSULE ORAL at 14:07

## 2023-10-15 RX ADMIN — POTASSIUM CHLORIDE 40 MEQ: 1500 TABLET, EXTENDED RELEASE ORAL at 08:39

## 2023-10-15 RX ADMIN — GABAPENTIN 100 MG: 100 CAPSULE ORAL at 08:39

## 2023-10-15 RX ADMIN — CARVEDILOL 12.5 MG: 12.5 TABLET, FILM COATED ORAL at 17:15

## 2023-10-15 RX ADMIN — ENOXAPARIN SODIUM 40 MG: 100 INJECTION SUBCUTANEOUS at 08:41

## 2023-10-15 RX ADMIN — GABAPENTIN 100 MG: 100 CAPSULE ORAL at 19:36

## 2023-10-15 RX ADMIN — SODIUM CHLORIDE, PRESERVATIVE FREE 10 ML: 5 INJECTION INTRAVENOUS at 08:41

## 2023-10-15 ASSESSMENT — PAIN SCALES - GENERAL: PAINLEVEL_OUTOF10: 0

## 2023-10-15 NOTE — PROGRESS NOTES
Pt A&Ox4, no confusion noted this shift. VSS with exception to elevated BP- however does not meet criteria for PRN intervention. Pt denies pain. No acute events noted this shift. Splint to RLE remains in place, CDI. Pt is tolerating ambulation well x1 assist with stedy and gb. NWB to RLE. Pt is voiding adequately per BRP, having one BM at this point this shift. Pt is noted to have decreased appetite but tolerating what PO intake she has had well. This RN and family frequently encouraging increased PO intake. Pt is currently resting in bed with all fall and safety precautions in place, bed locked and in lowest position, call light and belongings within reach. Pt denies further needs at this time.

## 2023-10-15 NOTE — PLAN OF CARE
Problem: Safety - Adult  Goal: Free from fall injury  10/15/2023 0718 by Rena Brink, RN  Outcome: Progressing  All fall precautions in place. Bed locked and in lowest position with alarm on. Overbed table and personal belonings within reach. Call light within reach and patient instructed to use call light for assistance. Non-skid socks on.       Problem: Discharge Planning  Goal: Discharge to home or other facility with appropriate resources  10/15/2023 0718 by Rena Brink, RN  Outcome: Progressing  Case management following for discharge needs/ plans

## 2023-10-15 NOTE — PROGRESS NOTES
Pt alert and oriented X4, intermittent dis-orientation present so re- orientation given. VS taken and recorded. BP elevated to 180's SBP so PRN Hydralazine given as per MAR SPO2 maintained at RA. Pt complains of pain on her R leg while moving but denied to take pain medicine. No any acute changes. Is on oral diet and tolerating well. Pt voiding with BRP X1-2 assist with GB & stedy. No BM during this shift. All fall precautions in place, call light within reach, no fall injury during this shift. Daughter at the bedside. Plan of care ongoing.

## 2023-10-15 NOTE — PROGRESS NOTES
Oral Daily    carvedilol  12.5 mg Oral BID WC    insulin lispro  0-4 Units SubCUTAneous TID WC    insulin lispro  0-4 Units SubCUTAneous Nightly    sodium chloride flush  5-40 mL IntraVENous 2 times per day    enoxaparin  40 mg SubCUTAneous Daily      Infusions:    sodium chloride      dextrose      sodium chloride       PRN Meds: magnesium sulfate, 2,000 mg, PRN  potassium chloride, 40 mEq, PRN   Or  potassium alternative oral replacement, 40 mEq, PRN   Or  potassium chloride, 10 mEq, PRN  hydrALAZINE, 10 mg, Q6H PRN  calcium carbonate, 500 mg, TID PRN  sodium chloride flush, 5-40 mL, PRN  sodium chloride, , PRN  traMADol, 50 mg, Q6H PRN  HYDROmorphone, 0.25 mg, Q3H PRN   Or  HYDROmorphone, 0.5 mg, Q3H PRN  ipratropium 0.5 mg-albuterol 2.5 mg, 1 Dose, Q4H PRN  glucose, 4 tablet, PRN  dextrose bolus, 125 mL, PRN   Or  dextrose bolus, 250 mL, PRN  glucagon (rDNA), 1 mg, PRN  dextrose, , Continuous PRN  sodium chloride flush, 5-40 mL, PRN  sodium chloride, , PRN  ondansetron, 4 mg, Q8H PRN   Or  ondansetron, 4 mg, Q6H PRN  polyethylene glycol, 17 g, Daily PRN  acetaminophen, 650 mg, Q6H PRN   Or  acetaminophen, 650 mg, Q6H PRN        Labs      Recent Results (from the past 24 hour(s))   POCT Glucose    Collection Time: 10/14/23  4:15 PM   Result Value Ref Range    POC Glucose 183 (H) 70 - 99 mg/dl    Performed on ACCU-CHEK    POCT Glucose    Collection Time: 10/14/23  8:11 PM   Result Value Ref Range    POC Glucose 181 (H) 70 - 99 mg/dl    Performed on ACCU-CHEK    CBC with Auto Differential    Collection Time: 10/15/23  7:32 AM   Result Value Ref Range    WBC 8.6 4.0 - 11.0 K/uL    RBC 3.08 (L) 4.00 - 5.20 M/uL    Hemoglobin 9.0 (L) 12.0 - 16.0 g/dL    Hematocrit 26.7 (L) 36.0 - 48.0 %    MCV 86.8 80.0 - 100.0 fL    MCH 29.4 26.0 - 34.0 pg    MCHC 33.8 31.0 - 36.0 g/dL    RDW 13.9 12.4 - 15.4 %    Platelets 566 297 - 360 K/uL    MPV 7.4 5.0 - 10.5 fL    Neutrophils % 75.4 %    Lymphocytes % 13.7 %    Monocytes % 7.9 %    Eosinophils % 2.7 %    Basophils % 0.3 %    Neutrophils Absolute 6.5 1.7 - 7.7 K/uL    Lymphocytes Absolute 1.2 1.0 - 5.1 K/uL    Monocytes Absolute 0.7 0.0 - 1.3 K/uL    Eosinophils Absolute 0.2 0.0 - 0.6 K/uL    Basophils Absolute 0.0 0.0 - 0.2 K/uL   Comprehensive Metabolic Panel w/ Reflex to MG    Collection Time: 10/15/23  7:32 AM   Result Value Ref Range    Sodium 131 (L) 136 - 145 mmol/L    Potassium reflex Magnesium 3.3 (L) 3.5 - 5.1 mmol/L    Chloride 98 (L) 99 - 110 mmol/L    CO2 23 21 - 32 mmol/L    Anion Gap 10 3 - 16    Glucose 201 (H) 70 - 99 mg/dL    BUN 13 7 - 20 mg/dL    Creatinine 0.9 0.6 - 1.2 mg/dL    Est, Glom Filt Rate >60 >60    Calcium 8.8 8.3 - 10.6 mg/dL    Total Protein 6.8 6.4 - 8.2 g/dL    Albumin 3.3 (L) 3.4 - 5.0 g/dL    Albumin/Globulin Ratio 0.9 (L) 1.1 - 2.2    Total Bilirubin 0.4 0.0 - 1.0 mg/dL    Alkaline Phosphatase 64 40 - 129 U/L    ALT 13 10 - 40 U/L    AST 25 15 - 37 U/L   Magnesium    Collection Time: 10/15/23  7:32 AM   Result Value Ref Range    Magnesium 1.80 1.80 - 2.40 mg/dL   POCT Glucose    Collection Time: 10/15/23  7:45 AM   Result Value Ref Range    POC Glucose 195 (H) 70 - 99 mg/dl    Performed on ACCU-CHEK    POCT Glucose    Collection Time: 10/15/23 11:14 AM   Result Value Ref Range    POC Glucose 221 (H) 70 - 99 mg/dl    Performed on ACCU-CHEK         Imaging/Diagnostics Last 24 Hours   No results found.     Electronically signed by Cecy Browning MD on 10/15/2023 at 1:11 PM

## 2023-10-16 LAB
ALBUMIN SERPL-MCNC: 3.2 G/DL (ref 3.4–5)
ALBUMIN/GLOB SERPL: 0.9 {RATIO} (ref 1.1–2.2)
ALP SERPL-CCNC: 57 U/L (ref 40–129)
ALT SERPL-CCNC: 15 U/L (ref 10–40)
ANION GAP SERPL CALCULATED.3IONS-SCNC: 10 MMOL/L (ref 3–16)
AST SERPL-CCNC: 22 U/L (ref 15–37)
BASOPHILS # BLD: 0 K/UL (ref 0–0.2)
BASOPHILS NFR BLD: 0.2 %
BILIRUB SERPL-MCNC: 0.4 MG/DL (ref 0–1)
BUN SERPL-MCNC: 13 MG/DL (ref 7–20)
CALCIUM SERPL-MCNC: 8.8 MG/DL (ref 8.3–10.6)
CHLORIDE SERPL-SCNC: 98 MMOL/L (ref 99–110)
CO2 SERPL-SCNC: 24 MMOL/L (ref 21–32)
CREAT SERPL-MCNC: 1.1 MG/DL (ref 0.6–1.2)
DEPRECATED RDW RBC AUTO: 13.9 % (ref 12.4–15.4)
EOSINOPHIL # BLD: 0.2 K/UL (ref 0–0.6)
EOSINOPHIL NFR BLD: 3 %
GFR SERPLBLD CREATININE-BSD FMLA CKD-EPI: 52 ML/MIN/{1.73_M2}
GLUCOSE BLD-MCNC: 166 MG/DL (ref 70–99)
GLUCOSE BLD-MCNC: 182 MG/DL (ref 70–99)
GLUCOSE BLD-MCNC: 215 MG/DL (ref 70–99)
GLUCOSE BLD-MCNC: 238 MG/DL (ref 70–99)
GLUCOSE SERPL-MCNC: 209 MG/DL (ref 70–99)
HCT VFR BLD AUTO: 25.2 % (ref 36–48)
HGB BLD-MCNC: 8.6 G/DL (ref 12–16)
LYMPHOCYTES # BLD: 1.3 K/UL (ref 1–5.1)
LYMPHOCYTES NFR BLD: 16.5 %
MCH RBC QN AUTO: 29.3 PG (ref 26–34)
MCHC RBC AUTO-ENTMCNC: 34.1 G/DL (ref 31–36)
MCV RBC AUTO: 85.9 FL (ref 80–100)
MONOCYTES # BLD: 0.9 K/UL (ref 0–1.3)
MONOCYTES NFR BLD: 10.8 %
NEUTROPHILS # BLD: 5.6 K/UL (ref 1.7–7.7)
NEUTROPHILS NFR BLD: 69.5 %
PERFORMED ON: ABNORMAL
PLATELET # BLD AUTO: 259 K/UL (ref 135–450)
PMV BLD AUTO: 7.6 FL (ref 5–10.5)
POTASSIUM SERPL-SCNC: 3.6 MMOL/L (ref 3.5–5.1)
PROT SERPL-MCNC: 6.6 G/DL (ref 6.4–8.2)
RBC # BLD AUTO: 2.93 M/UL (ref 4–5.2)
SODIUM SERPL-SCNC: 132 MMOL/L (ref 136–145)
WBC # BLD AUTO: 8.1 K/UL (ref 4–11)

## 2023-10-16 PROCEDURE — 6360000002 HC RX W HCPCS: Performed by: FAMILY MEDICINE

## 2023-10-16 PROCEDURE — 6370000000 HC RX 637 (ALT 250 FOR IP): Performed by: FAMILY MEDICINE

## 2023-10-16 PROCEDURE — 97110 THERAPEUTIC EXERCISES: CPT

## 2023-10-16 PROCEDURE — 97535 SELF CARE MNGMENT TRAINING: CPT

## 2023-10-16 PROCEDURE — 2580000003 HC RX 258: Performed by: ORTHOPAEDIC SURGERY

## 2023-10-16 PROCEDURE — 2580000003 HC RX 258: Performed by: FAMILY MEDICINE

## 2023-10-16 PROCEDURE — 36415 COLL VENOUS BLD VENIPUNCTURE: CPT

## 2023-10-16 PROCEDURE — 80053 COMPREHEN METABOLIC PANEL: CPT

## 2023-10-16 PROCEDURE — 97530 THERAPEUTIC ACTIVITIES: CPT

## 2023-10-16 PROCEDURE — 6360000002 HC RX W HCPCS: Performed by: SURGERY

## 2023-10-16 PROCEDURE — 1200000000 HC SEMI PRIVATE

## 2023-10-16 PROCEDURE — 85025 COMPLETE CBC W/AUTO DIFF WBC: CPT

## 2023-10-16 PROCEDURE — 6370000000 HC RX 637 (ALT 250 FOR IP): Performed by: SURGERY

## 2023-10-16 RX ORDER — AMLODIPINE BESYLATE 10 MG/1
10 TABLET ORAL DAILY
Status: DISCONTINUED | OUTPATIENT
Start: 2023-10-17 | End: 2023-10-17 | Stop reason: HOSPADM

## 2023-10-16 RX ORDER — LOSARTAN POTASSIUM 25 MG/1
25 TABLET ORAL DAILY
Status: DISCONTINUED | OUTPATIENT
Start: 2023-10-16 | End: 2023-10-17 | Stop reason: HOSPADM

## 2023-10-16 RX ADMIN — SODIUM CHLORIDE, PRESERVATIVE FREE 10 ML: 5 INJECTION INTRAVENOUS at 21:00

## 2023-10-16 RX ADMIN — GABAPENTIN 100 MG: 100 CAPSULE ORAL at 20:23

## 2023-10-16 RX ADMIN — SERTRALINE HYDROCHLORIDE 100 MG: 100 TABLET ORAL at 20:23

## 2023-10-16 RX ADMIN — INSULIN LISPRO 1 UNITS: 100 INJECTION, SOLUTION INTRAVENOUS; SUBCUTANEOUS at 08:17

## 2023-10-16 RX ADMIN — INSULIN LISPRO 1 UNITS: 100 INJECTION, SOLUTION INTRAVENOUS; SUBCUTANEOUS at 12:14

## 2023-10-16 RX ADMIN — ENOXAPARIN SODIUM 40 MG: 100 INJECTION SUBCUTANEOUS at 08:17

## 2023-10-16 RX ADMIN — AMLODIPINE BESYLATE 5 MG: 5 TABLET ORAL at 08:17

## 2023-10-16 RX ADMIN — CLOPIDOGREL BISULFATE 75 MG: 75 TABLET ORAL at 08:17

## 2023-10-16 RX ADMIN — GABAPENTIN 100 MG: 100 CAPSULE ORAL at 15:13

## 2023-10-16 RX ADMIN — HYDRALAZINE HYDROCHLORIDE 10 MG: 20 INJECTION, SOLUTION INTRAMUSCULAR; INTRAVENOUS at 18:32

## 2023-10-16 RX ADMIN — SODIUM CHLORIDE, PRESERVATIVE FREE 10 ML: 5 INJECTION INTRAVENOUS at 20:24

## 2023-10-16 RX ADMIN — CARVEDILOL 12.5 MG: 12.5 TABLET, FILM COATED ORAL at 17:03

## 2023-10-16 RX ADMIN — ANTACID TABLETS 500 MG: 500 TABLET, CHEWABLE ORAL at 20:23

## 2023-10-16 RX ADMIN — GABAPENTIN 100 MG: 100 CAPSULE ORAL at 08:16

## 2023-10-16 RX ADMIN — QUETIAPINE FUMARATE 25 MG: 25 TABLET ORAL at 20:23

## 2023-10-16 RX ADMIN — SODIUM CHLORIDE, PRESERVATIVE FREE 10 ML: 5 INJECTION INTRAVENOUS at 08:17

## 2023-10-16 RX ADMIN — LOSARTAN POTASSIUM 25 MG: 25 TABLET, FILM COATED ORAL at 15:35

## 2023-10-16 RX ADMIN — PANTOPRAZOLE SODIUM 40 MG: 40 TABLET, DELAYED RELEASE ORAL at 05:41

## 2023-10-16 RX ADMIN — CARVEDILOL 12.5 MG: 12.5 TABLET, FILM COATED ORAL at 08:17

## 2023-10-16 NOTE — OP NOTE
Operative Note      Patient: Lourdes Branch  YOB: 1945  MRN: [id number]    Date of Procedure: 10/11/2023    Preoperative diagnosis: Bimalleolar fracture of right ankle    Post-Op Diagnosis: Same       Procedure(s):  OPEN REDUCTION INTERNAL FIXATION RIGHT ANKLE including medial and lateral malleolus    Surgeon(s):  Aurelia Castro MD    Assistant:   Surgical Assistant: Irene Watt    Anesthesia: General    Estimated Blood Loss (mL): Minimal    Complications: None    Specimens:   * No specimens in log *    Implants:  Implant Name Type Inv. Item Serial No.  Lot No. LRB No. Used Action   PLATE BNE A08AR 5 H R DST LAT FIBULAR S STL JIM COMPR FOR - [id number]  PLATE BNE O34ES 5 H R DST LAT FIBULAR S STL JIM COMPR FOR  [employer]  Right 1 Implanted   SCREW BNE L12MM DIA3.5MM MARYBEL S STL ST NONCANNULATED JIM - [id number]  SCREW BNE L12MM DIA3.5MM MARYBEL S STL ST NONCANNULATED JIM  DEPU[employer]WD  Right 2 Implanted   SCREW BNE L14MM DIA2.7MM MARYBEL S STL ST JIM FULL THRD T8 - [id number]  SCREW BNE L14MM DIA2.7MM MARYBEL S STL ST JIM FULL THRD T8  DEPU[employer]WD  Right 3 Implanted   SCREW BNE L12MM DIA2.7MM MARYBEL S STL ST JIM FULL THRD T8 - [id number]  SCREW BNE L12MM DIA2.7MM MARYBEL S STL ST JIM FULL THRD T8  DEPU[employer]WD  Right 2 Implanted   SCREW BNE L36MM DIA4MM S STL NIDA LNG HALF THRD SM HEX SOCK - [id number]  SCREW BNE L36MM DIA4MM S STL NIDA LNG HALF THRD SM HEX SOCK  DEPU[employer]WD  Right 2 Implanted   SCREW BNE L10MM DIA2.7MM MARYBEL S STL ST JIM FULL THRD T8 - [id number]  SCREW BNE L10MM DIA2.7MM MARYBEL S STL ST JIM FULL THRD T8  DEPU[employer]WD  Right 1 Implanted         Drains: * No LDAs found *    Findings: per dictation        Detailed Description of Procedure: The patient was met in the preoperative holding area. The surgical site was identified marked. She was taken back to the operative room placed on table supine position.   General anesthesia was performed. The lower extremity was then prepped and draped using sterile technique. Formal timeout was performed and procedure was reaffirmed. Antibiotics were given within 30 minutes of skin incision. At that point the tourniquet was insufflated 275 mmHg. Lateral based incision was made over the distal fibula. Careful dissection was performed through subtenons tissue to avoid injury to the superficial peroneal nerve. Hematoma at the fracture site was encountered. We then used a point reduction clamp to obtain an initial preliminary reduction. We will we did perform a lag fixation with two  2.7 mm lag screw which had overall good purchase. We selected a distal fibular locking plate by [employer]. This was held in place followed by the reduction clamp. We proceeded to provide fixation after fluoroscopic views demonstrated appropriate placement. We placed 2 3.5 mm fully threaded cortical screws proximal to the fracture site. We then placed locking screws within the distal cluster of the plate. At that point we focused our attention to the medial malleolus. Incision was made over the central portion of the medial malleolus. Careful dissection was performed through subcutaneous tissue. We did encounter the fracture. Hematoma was evacuated with suction device. We did notice some comminution involving the distal posterior fragment of the medial malleolus. However we were able to obtain an anatomic reduction with reported reduction clamp. This was followed by placement of our guidewires for 4.0 mm partially-threaded cannulated screws. This was done under fluoroscopic guidance. It did appear to be anatomic. Measuring was performed. We then placed 2 4.0 mm partially-threaded cannulated screws across the fracture site. Great purchase was obtained. The screw lengths were both 36 mm. Following that we did perform a stress test.  There was no evidence of syndesmotic disruption.

## 2023-10-16 NOTE — PROGRESS NOTES
Pt. Oriented x4 with intermittent confusion. VSS on RA. Pt. Denies pain. Dressing to right ankle CDI, sensation intact. Pt. Voiding well via BRP, up x1 with stedy. All fall precautions in place and call light is within reach.

## 2023-10-16 NOTE — DISCHARGE INSTR - COC
Continuity of Care Form    Patient Name: Lilly Mattson   :  1945  MRN:  [id number]    Admit date:  10/10/2023  Discharge date:  ***    Code Status Order: Full Code   Advance Directives:     Admitting Physician:  Tavo Spencer MD  PCP: Marco A Patel    Discharging Nurse: [facility] Unit/Room#: 8825/8198-97  Discharging Unit Phone Number: ***    Emergency Contact:   Extended Emergency Contact Information  Primary Emergency Contact: [address] [place] [address] Phone: [phone]  Mobile Phone: [phone]  Relation: Brother/Sister  Secondary Emergency Contact: [facility] Phone: [phone]  Relation: Child    Past Surgical History:  Past Surgical History:   Procedure Laterality Date    ANKLE FRACTURE SURGERY Right 10/11/2023    OPEN REDUCTION INTERNAL FIXATION RIGHT ANKLE performed by Hong Curtis MD at  [address] / OPEN SHOULDER Left 2019    LEFT SHOULDER ARTHROSCOPY, ROTATOR CUFF REPAIR, SUBACROMIAL DECOMPRESSION, LABRAL DEBRIDEMENT performed by Jannis Hodgkins, MD at [facility]  10/31/2016    L4-L5 RE-EXPLORATION AND REPLACEMENT OF INTERBODY BONE GRAFT    BLADDER SUSPENSION      CARDIAC SURGERY      TRIPLE BYPASS    CARDIAC SURGERY      STENT     CHOLECYSTECTOMY      COLONOSCOPY      HYSTERECTOMY (CERVIX STATUS UNKNOWN)      JOINT REPLACEMENT      hip    KNEE SURGERY      OTHER SURGICAL HISTORY N/A 2016    L3-L4, L4-L5 TRANSFORAMINAL LUMBAR INTERBODY FUSION WITH AIRO    SHOULDER SURGERY Right     SHOULDER TEAR REPAIR    WISDOM TOOTH EXTRACTION         Immunization History: There is no immunization history on file for this patient.     Active Problems:  Patient Active Problem List   Diagnosis Code    AC (acromioclavicular) arthritis M19.019    CAD in native artery I25.10    HLD (hyperlipidemia) E78.5    Essential hypertension I10    Type 2 diabetes mellitus ([address]) E11.9    Lumbar radiculopathy M54.16    Chronic low back

## 2023-10-16 NOTE — PROGRESS NOTES
Physical Therapy  Facility/Department: [facility] 5T ORTHO/NEURO  Daily Treatment Note  NAME: Anand Joel  : 1945  MRN: [id number]    Date of Service: 10/16/2023    Discharge Recommendations: Anand Joel scored a 10/24 on the AM-PAC short mobility form. Current research shows that an AM-PAC score of 17 or less is typically not associated with a discharge to the patient's home setting. Based on the patient's AM-PAC score and their current functional mobility deficits, it is recommended that the patient have 3-5 sessions per week of Physical Therapy at d/c to increase the patient's independence. Please see assessment section for further patient specific details. If patient discharges prior to next session this note will serve as a discharge summary. Please see below for the latest assessment towards goals. Subacute/Skilled Nursing Facility   PT Equipment Recommendations  Equipment Needed:  (defer)    Patient Diagnosis(es): The primary encounter diagnosis was Closed fracture of right ankle, initial encounter. A diagnosis of Bimalleolar ankle fracture, right, closed, initial encounter was also pertinent to this visit. Assessment   Assessment: Pt tolerated treatment session fair with mobility limited by decreased activity tolerance. Pt completed bed mobility with min x1, sit to stand with min x2, and stand pivot + hop from bed to chair with min + CGA. Pt required verbal cueing for sequencing of bed to chair transfer and tactile cueing at R LE for maintenance of NWB status. Pt unable to complete further transfers or tolerate ambulation on this date due to increased fatigue. Pt unsafe to transfer or ambulate alone at this time due to deficits in B LE strength and endurance. Recommending further inpatient PT to maximize safety and independence with mobility. PT to f/u. Activity Tolerance: Patient limited by endurance; Patient limited by fatigue;Patient tolerated treatment well (BP 1 after bed to Goals  Short Term Goals  Time Frame for Short Term Goals: by d/c  Short Term Goal 1: Pt will complete bed mobility with mod I - ongoing  Short Term Goal 2: Pt will complete sit to stand transfers with LRAD and min x2 with maintenance of WB status - ongoing  Short Term Goal 3: Pt will complete bed to chair transfers with LRAD and min x2 - met 10/16 new goal: Pt will complete bed to chair transfers with LRAD and CGAx1  Short Term Goal 4: Pt will ambulate 5' with LRAD and min x2 - ongoing  Patient Goals   Patient Goals : to go home    Education  Patient Education  Education Given To: Patient; Family  Education Provided: Role of Therapy;Plan of Care;Transfer Training;Precautions  Education Method: Verbal  Barriers to Learning: Cognition  Education Outcome: Verbalized understanding;Demonstrated understanding;Continued education needed    Therapy Time   Individual Concurrent Group Co-treatment   Time In 1314         Time Out 1346         Minutes [address] [place], [place]

## 2023-10-16 NOTE — PLAN OF CARE
Problem: Pain  Goal: Verbalizes/displays adequate comfort level or baseline comfort level  Outcome: Progressing  Note: Pt. Managing pain per MAR. Problem: Safety - Adult  Goal: Free from fall injury  10/15/2023 2013 by Kemar Roman RN  Outcome: Progressing  Note: All fall precautions in place and call light is within reach.

## 2023-10-16 NOTE — PROGRESS NOTES
Occupational Therapy  Facility/Department: [facility] 5T ORTHO/NEURO  Daily Treatment Note  NAME: Radha Deal  : 1945  MRN: [id number]    Date of Service: 10/16/2023    Discharge Recommendations: Radha Deal scored a 15/24 on the AM-PAC ADL Inpatient form. Current research shows that an AM-PAC score of 17 or less is typically not associated with a discharge to the patient's home setting. Based on the patient's AM-PAC score and their current ADL deficits, it is recommended that the patient have 5-7 sessions per week of Occupational Therapy at d/c to increase the patient's independence. At this time, this patient demonstrates complex nursing, medical, and rehabilitative needs, and would benefit from intensive rehabilitation services upon discharge from the Inpatient setting. This patient demonstrates the ability to participate in and benefit from an intensive therapy program with a coordinated interdisciplinary team approach to foster frequent, structured, and documented communication among disciplines, who will work together to establish, prioritize, and achieve treatment goals. Please see assessment section for further patient specific details. If patient discharges prior to next session this note will serve as a discharge summary. Please see below for the latest assessment towards goals. IP Rehab         Patient Diagnosis(es): The primary encounter diagnosis was Closed fracture of right ankle, initial encounter. A diagnosis of Bimalleolar ankle fracture, right, closed, initial encounter was also pertinent to this visit. Assessment    Assessment: Pt typically IND with ADLs and mobility/transfers. Pt currently requires significant assistance for all ADLs/functional mobility performed during this session.  Pt would benefit from further therapy to maximize therapeutic gains and functional independence      Activity Tolerance: Patient tolerated treatment well  Discharge Recommendations: IP

## 2023-10-16 NOTE — PROGRESS NOTES
Patient alert and oriented x4. Denies any pain. Splint to R ankle clean dry and intact. Pt up x1 w/ stedy, tolerating well. BP elevated this shift, MD aware and new orders for PO blood pressure medications placed. Plans to discharge tomorrow at 1200. All fall precautions in place and call light within reach.

## 2023-10-16 NOTE — PLAN OF CARE
Problem: Discharge Planning  Goal: Discharge to home or other facility with appropriate resources  Outcome: Progressing     Problem: Pain  Goal: Verbalizes/displays adequate comfort level or baseline comfort level  10/16/2023 0753 by Mark Perez RN  Outcome: Progressing     Problem: Skin/Tissue Integrity  Goal: Absence of new skin breakdown  Description: 1. Monitor for areas of redness and/or skin breakdown  2. Assess vascular access sites hourly  3. Every 4-6 hours minimum:  Change oxygen saturation probe site  4. Every 4-6 hours:  If on nasal continuous positive airway pressure, respiratory therapy assess nares and determine need for appliance change or resting period.   Outcome: Progressing

## 2023-10-17 VITALS
DIASTOLIC BLOOD PRESSURE: 76 MMHG | SYSTOLIC BLOOD PRESSURE: 158 MMHG | RESPIRATION RATE: 17 BRPM | HEIGHT: 64 IN | BODY MASS INDEX: 31.92 KG/M2 | WEIGHT: 187 LBS | TEMPERATURE: 98.2 F | HEART RATE: 65 BPM | OXYGEN SATURATION: 94 %

## 2023-10-17 PROBLEM — Z87.81 S/P ORIF (OPEN REDUCTION INTERNAL FIXATION) FRACTURE: Status: ACTIVE | Noted: 2023-10-17

## 2023-10-17 PROBLEM — Z98.890 S/P ORIF (OPEN REDUCTION INTERNAL FIXATION) FRACTURE: Status: ACTIVE | Noted: 2023-10-17

## 2023-10-17 LAB
GLUCOSE BLD-MCNC: 185 MG/DL (ref 70–99)
PERFORMED ON: ABNORMAL

## 2023-10-17 PROCEDURE — 6370000000 HC RX 637 (ALT 250 FOR IP): Performed by: FAMILY MEDICINE

## 2023-10-17 PROCEDURE — 6370000000 HC RX 637 (ALT 250 FOR IP): Performed by: SURGERY

## 2023-10-17 PROCEDURE — 2580000003 HC RX 258: Performed by: FAMILY MEDICINE

## 2023-10-17 PROCEDURE — 94761 N-INVAS EAR/PLS OXIMETRY MLT: CPT

## 2023-10-17 PROCEDURE — 6360000002 HC RX W HCPCS: Performed by: FAMILY MEDICINE

## 2023-10-17 PROCEDURE — 2580000003 HC RX 258: Performed by: ORTHOPAEDIC SURGERY

## 2023-10-17 PROCEDURE — 92610 EVALUATE SWALLOWING FUNCTION: CPT

## 2023-10-17 PROCEDURE — 92526 ORAL FUNCTION THERAPY: CPT

## 2023-10-17 RX ADMIN — GABAPENTIN 100 MG: 100 CAPSULE ORAL at 08:13

## 2023-10-17 RX ADMIN — ENOXAPARIN SODIUM 40 MG: 100 INJECTION SUBCUTANEOUS at 08:13

## 2023-10-17 RX ADMIN — CLOPIDOGREL BISULFATE 75 MG: 75 TABLET ORAL at 08:14

## 2023-10-17 RX ADMIN — PANTOPRAZOLE SODIUM 40 MG: 40 TABLET, DELAYED RELEASE ORAL at 07:07

## 2023-10-17 RX ADMIN — SODIUM CHLORIDE, PRESERVATIVE FREE 10 ML: 5 INJECTION INTRAVENOUS at 08:14

## 2023-10-17 RX ADMIN — SODIUM CHLORIDE, PRESERVATIVE FREE 10 ML: 5 INJECTION INTRAVENOUS at 08:17

## 2023-10-17 RX ADMIN — CARVEDILOL 12.5 MG: 12.5 TABLET, FILM COATED ORAL at 08:13

## 2023-10-17 RX ADMIN — AMLODIPINE BESYLATE 10 MG: 10 TABLET ORAL at 08:14

## 2023-10-17 RX ADMIN — LOSARTAN POTASSIUM 25 MG: 25 TABLET, FILM COATED ORAL at 08:13

## 2023-10-17 ASSESSMENT — PAIN SCALES - GENERAL: PAINLEVEL_OUTOF10: 0

## 2023-10-17 NOTE — PROGRESS NOTES
Speech Language Pathology  Facility/Department:[facility] 5T ORTHO/NEURO  Bedside Swallowing Evaluation/treatment  Name: Elizabeth Huizar  : 1945  MRN: [id number]                                                         Patient Diagnosis(es):   Patient Active Problem List    Diagnosis Date Noted    Closed fracture of right ankle 10/11/2023    Pre-op evaluation 10/11/2023    Bimalleolar ankle fracture, right, closed, initial encounter 10/10/2023    Incomplete rotator cuff tear or rupture of left shoulder, not specified as traumatic 2019    Hypokalemia 2016    Hypomagnesemia 2016    Microcytic anemia 2016    Intractable low back pain     Foot fracture 2016    Lower extremity weakness 2016    HLD (hyperlipidemia)     Essential hypertension     Type 2 diabetes mellitus (HCC)     Lumbar radiculopathy     Chronic low back pain     AC (acromioclavicular) arthritis 2014    CAD in native artery 2004       Past Medical History:   Diagnosis Date    Anxiety     Anxiety     CAD (coronary artery disease)     Chronic GERD     Chronic low back pain     Diverticulosis     Foot fracture 2016    right     GERD (gastroesophageal reflux disease)     Hearing loss     Hearing loss     High blood pressure     History of blood transfusion     History of MI (myocardial infarction)     HLD (hyperlipidemia)     HTN (hypertension)     IBS (irritable bowel syndrome)     Lumbar radiculopathy     Neuropathy     feet    Spinal stenosis     Type 2 diabetes mellitus ([address])     UTI (urinary tract infection)     takes cranberry pills     Past Surgical History:   Procedure Laterality Date    ANKLE FRACTURE SURGERY Right 10/11/2023    OPEN REDUCTION INTERNAL FIXATION RIGHT ANKLE performed by Kayla Franco MD at [address] / OPEN SHOULDER Left 2019    LEFT SHOULDER ARTHROSCOPY, ROTATOR CUFF REPAIR, SUBACROMIAL DECOMPRESSION, LABRAL DEBRIDEMENT

## 2023-10-17 NOTE — PROGRESS NOTES
Patient discharged to SNF via transport. IV removed and pressure applied. Discharge paperwork complete and sent with patient. No other questions or concerns at this time.

## 2023-10-17 NOTE — PLAN OF CARE
Problem: Discharge Planning  Goal: Discharge to home or other facility with appropriate resources  10/17/2023 1020 by Lilia Hopson RN  Outcome: Progressing     Problem: Pain  Goal: Verbalizes/displays adequate comfort level or baseline comfort level  10/17/2023 1020 by Lilia Hopson RN  Outcome: Progressing     Problem: Skin/Tissue Integrity  Goal: Absence of new skin breakdown  Description: 1. Monitor for areas of redness and/or skin breakdown  2. Assess vascular access sites hourly  3. Every 4-6 hours minimum:  Change oxygen saturation probe site  4. Every 4-6 hours:  If on nasal continuous positive airway pressure, respiratory therapy assess nares and determine need for appliance change or resting period.   10/17/2023 1020 by Lilia Hopson RN  Outcome: Progressing

## 2023-10-17 NOTE — PLAN OF CARE
Problem: Discharge Planning  Goal: Discharge to home or other facility with appropriate resources  Outcome: Progressing  Flowsheets (Taken 10/17/2023 0104)  Discharge to home or other facility with appropriate resources:   Arrange for needed discharge resources and transportation as appropriate   Identify barriers to discharge with patient and caregiver     Problem: Pain  Goal: Verbalizes/displays adequate comfort level or baseline comfort level  Outcome: Progressing  Pt endorses adequate comfort level. Problem: Skin/Tissue Integrity  Goal: Absence of new skin breakdown  Description: 1. Monitor for areas of redness and/or skin breakdown  2. Assess vascular access sites hourly  3. Every 4-6 hours minimum:  Change oxygen saturation probe site  4. Every 4-6 hours:  If on nasal continuous positive airway pressure, respiratory therapy assess nares and determine need for appliance change or resting period. Outcome: Progressing     Problem: Safety - Adult  Goal: Free from fall injury  Outcome: Progressing  Pt free of fall and injury. All fall precautions in place.

## 2023-10-17 NOTE — DISCHARGE SUMMARY
V2.0  Discharge Summary    Name:  Chase Kennedy /Age/Sex: 1945 (70 y.o. female)   Admit Date: 10/10/2023  Discharge Date: 10/17/23    MRN & CSN:  [id number] & [id number] Encounter Date and Time 10/17/23 2:01 PM EDT    Attending:  No att. providers found Discharging Provider: Sarina Serrano MD       Hospital Course:     Brief HPI: 78 y.o. female who presented to [facility] with fall. PMHx significant for DM2 HTN CAD on Plavix. She was found with bilateral ankle fractures . She underwent ORIF of Rt ankle . She did well and was discharged to SNF    Brief Problem Based Course:   Bilateral ankle fracture after a fall      The patient expressed appropriate understanding of, and agreement with the discharge recommendations, medications, and plan. Consults this admission:  IP CONSULT TO ORTHOPEDIC SURGERY  IP CONSULT TO CARDIOLOGY    Discharge Diagnosis:   Bimalleolar ankle fracture, right, closed, initial encounter        Discharge Instruction:   Follow up appointments: PCP, ortho  Primary care physician: Monica Boo within 2 weeks  Diet: regular diet   Activity: activity as tolerated  Disposition: Discharged to:   []Home, [][facility], [x]SNF, []Acute Rehab, []Hospice   Condition on discharge: Stable  Labs and Tests to be Followed up as an outpatient by PCP or Specialist:     Discharge Medications:        Medication List        START taking these medications      aspirin 325 MG tablet  Commonly known as: Naga Aspirin  Take 1 tablet by mouth in the morning and at bedtime     HYDROcodone-acetaminophen 7.5-325 MG per tablet  Commonly known as: Norco  Take 1 tablet by mouth every 4 hours as needed for Pain for up to 7 days.  Intended supply: 30 days Max Daily Amount: 6 tablets            CONTINUE taking these medications      amLODIPine 5 MG tablet  Commonly known as: NORVASC  Take 1 tablet by mouth daily     carvedilol 12.5 MG tablet  Commonly known as: COREG  Take 1 tablet by mouth 2 times daily

## 2023-10-17 NOTE — CARE COORDINATION
CM following: AZIZA spoke with pt's daughter, Amparo Guevara, on the phone and reviewed accepting facilities. Dayana would like the pt to discharge to [facility]. AZIZA educated Amparo Guevara on precert process and Dayana expressed understanding. AZIZA spoke with Georgia at [facility] who accepted the referral and reported that her precert team will submit for precert for pt first thing Monday morning. AZIZA will continue to follow for discharge planning.   Electronically signed by JENNY Rothman on 10/14/2023 at 2:43 PM  [phone]
Case Management Assessment  Initial Evaluation    Date/Time of Evaluation: 10/11/2023 3:03 PM  Assessment Completed by: JENNY Aleman    If patient is discharged prior to next notation, then this note serves as note for discharge by case management. Patient Name: Deb Cardona                   YOB: 1945  Diagnosis: Closed fracture of right ankle, initial encounter [S82.891A]  Bimalleolar ankle fracture, right, closed, initial encounter Gerald Gonzalez                   Date / Time: 10/10/2023  8:42 PM    Patient Admission Status: Inpatient   Readmission Risk (Low < 19, Mod (19-27), High > 27): No data recorded  Current PCP: Ralph Perla  PCP verified by CM? Yes    Chart Reviewed: Yes      History Provided by: Patient  Patient Orientation: Alert and Oriented    Patient Cognition: Alert    Hospitalization in the last 30 days (Readmission):  No    If yes, Readmission Assessment in CM Navigator will be completed. Advance Directives:      Code Status: Full Code   Patient's Primary Decision Maker is: Legal Next of Kin (has four adult children)      Discharge Planning:    Patient lives with: Family Members (Lives with brother-in-law) Type of Home: House  Primary Care Giver: Self  Patient Support Systems include: Children, Family Members (Lives with brother-in-law)   Current Financial resources: Medicare  Current community resources: None  Current services prior to admission: Durable Medical Equipment            Current DME: Cane            Type of Home Care services:  None    ADLS  Prior functional level: Independent in ADLs/IADLs  Current functional level: Other (see comment) (PT/OT evals pending)    PT AM-PAC:   /24  OT AM-PAC:   /24    Family can provide assistance at DC: Other (comment) (depends on level of need)  Would you like Case Management to discuss the discharge plan with any other family members/significant others, and if so, who?  No  Plans to Return to Present Housing:
DISCHARGE PLANNIN    Discharge order noted. This CM was amde aware they PT that patient will need SNF at discharge. This CM met with patient and family at bedside who requested a SNF list to review. List provided. Will follow up with choices so referrals can be made. UPDATE: 200  Family is requesting a referral be sent to [facility]; they did not give other options due to having family work at that facility. Referral sent via [employer]. CM team will continue to follow.   Ajit Santo, MIKE Case Manager  [phone]
UPDATE: AZIZA spoke with Georgia at [facility], precert has been approved and they can accept the pt tomorrow.  has arranged transportation at noon tomorrow and has made pt and pt's daughter, Elizabeth Talavera, aware of transportation. AZIZA reviewed IMM letter verbally with Dayana on the phone at the bedside with pt. Georgia at [facility] is aware of transport time tomorrow and accepting of pt report number: [phone] fax: [phone]. CM will continue to follow. Electronically signed by JENNY Teixeira on 10/16/2023 at 4:17 PM  [phone]    AZIZA following: AZIZA spoke with Georgia at [facility] ANGELITO LEWIS was submitted this Neldon Joan is aware that pt is discharge ready when precert is obtained. AZIZA met with pt at bedside and updated on precert pending status for [facility]. Pt is in agreement with discharge plan. AZIZA will continue to follow for discharge planning.   Electronically signed by JENNY Teixeira on 10/16/2023 at 1:07 PM  [phone]
UPDATE: CM met with pt and daughter, Tino Herrera, at bedside with son, Edouard Acharya, on the phone. CM updated that [facility] unable to accept and in agreement with referrals to [facility] at [address]. Referrals have been. If these referrals are unable to accept Dayana open to referrals to [address] [place] [address] and [facility]. CM will continue to follow for discharge planning. Electronically signed by JENNY Caballero on 10/13/2023 at 12:09 PM  [phone]    AZIZA following: CM left VM from [id number] [place] [address] at [facility] to follow up on referral. CM left appropriate contact information for Darcie to reach CM. CM will continue to follow.   Electronically signed by JENNY Caballero on 10/13/2023 at 8:47 AM  [phone]
Yes    ADLS:  Current PT AM-PAC Score: 10 /24  Current OT AM-PAC Score: 15 /24      DISCHARGE Disposition: [address] (SNF): [facility] Phone: [phone] Fax: [phone]    LOC at discharge: Skilled  [address] Completed: Yes    Notification completed in HENS/PAS?:  Yes : CM has completed HENS online through secure website for SNF admission at [facility]. Document ID #: [id number]    IMM Completed:   Yes, Case management has presented and reviewed IMM letter #2 to the patient and/or family/ POA. Patient and/or family/POA verbalized understanding of their medicare rights and appeal process if needed. Patient and/or family/POA has signed and placed today's date (10/16/2023) and time ([phone]) on letter #2 on the the appropriate lines. Patient and/or family/POA, provided copy of signed letter and they are aware that this original copy of IMM letter #2 is available prior to discharge from the paper chart on the unit. Electronic documentation has been entered into epic for IMM letter #2 and original paper copy has been added to the paper chart at the nurses station. IMM Letter given to Patient/Family/Significant other/Guardian/POA/by[de-identified] JANAE Santiago  IMM Letter date given[de-identified] 10/16/23  IMM Letter time given[de-identified] 7144    Transportation:  Transportation PLAN for discharge: EMS transportation   Mode of Transport: Ambulance stretcher - S  Reason for medical transport: Other: right bimalleolar ankle fracture, right ORIF, NWB RLE  Name of [address] [place] [address] Road: [facility]: [phone]  Time of Transport: 1200    Transport form completed: Yes    Referrals made at [facility] for outpatient continued care:  Not Applicable    Additional CM Notes: Pt is from home and will discharge to [facility] for skilled care and rehab. Transportation arranged with [facility] at noon today. Pt and pt's daughter, Zahira Carrera, aware of transport time and in agreement.  Georgia with [facility] is aware of transport time and

## 2023-10-17 NOTE — PROGRESS NOTES
Pt axo x4. No acute neuro changes or confusion at this time. Denies pain. Dressing to r ankle CD/ pt ambulating x1 with stedy, nwb to r ankle. Voding per brp. All fall precautions in place. Denies further needs at this time. Call light in reach.

## 2023-10-29 PROBLEM — Z78.9 STATIN INTOLERANCE: Status: ACTIVE | Noted: 2023-10-29

## 2023-10-29 PROBLEM — S82.841D CLOSED BIMALLEOLAR FRACTURE OF RIGHT ANKLE WITH ROUTINE HEALING: Status: ACTIVE | Noted: 2023-10-10

## 2023-10-29 PROBLEM — Z01.818 PRE-OP EVALUATION: Status: RESOLVED | Noted: 2023-10-11 | Resolved: 2023-10-29

## 2023-10-29 PROBLEM — M75.112 INCOMPLETE ROTATOR CUFF TEAR OR RUPTURE OF LEFT SHOULDER, NOT SPECIFIED AS TRAUMATIC: Status: RESOLVED | Noted: 2019-06-06 | Resolved: 2023-10-29

## 2023-10-29 PROBLEM — I50.42 CHRONIC COMBINED SYSTOLIC AND DIASTOLIC CHF (CONGESTIVE HEART FAILURE) (HCC): Status: ACTIVE | Noted: 2023-10-29

## 2023-10-29 PROBLEM — D62 ACUTE BLOOD LOSS AS CAUSE OF POSTOPERATIVE ANEMIA: Status: ACTIVE | Noted: 2023-10-29

## 2023-10-29 PROBLEM — E11.40 TYPE 2 DIABETES MELLITUS WITH DIABETIC NEUROPATHY (HCC): Status: ACTIVE | Noted: 2023-10-29

## 2023-11-02 ENCOUNTER — OFFICE VISIT (OUTPATIENT)
Dept: ORTHOPEDIC SURGERY | Age: 78
End: 2023-11-02

## 2023-11-02 VITALS — HEIGHT: 64 IN | WEIGHT: 187 LBS | BODY MASS INDEX: 31.92 KG/M2

## 2023-11-02 DIAGNOSIS — M25.571 RIGHT ANKLE PAIN, UNSPECIFIED CHRONICITY: Primary | ICD-10-CM

## 2023-11-03 ENCOUNTER — TELEPHONE (OUTPATIENT)
Dept: ORTHOPEDIC SURGERY | Age: 78
End: 2023-11-03

## 2023-11-28 ENCOUNTER — OFFICE VISIT (OUTPATIENT)
Dept: ORTHOPEDIC SURGERY | Age: 78
End: 2023-11-28

## 2023-11-28 VITALS — HEIGHT: 64 IN | BODY MASS INDEX: 31.92 KG/M2 | WEIGHT: 187 LBS

## 2023-11-28 DIAGNOSIS — M25.571 RIGHT ANKLE PAIN, UNSPECIFIED CHRONICITY: Primary | ICD-10-CM

## 2023-11-28 PROCEDURE — 99024 POSTOP FOLLOW-UP VISIT: CPT | Performed by: ORTHOPAEDIC SURGERY

## 2023-12-05 ENCOUNTER — HOSPITAL ENCOUNTER (OUTPATIENT)
Dept: PHYSICAL THERAPY | Age: 78
Setting detail: THERAPIES SERIES
Discharge: HOME | End: 2023-12-05
Attending: ORTHOPAEDIC SURGERY

## 2023-12-07 NOTE — PLAN OF CARE
(20)                   KNEE Flex (140)          Ext (0)                     ANKLE DF (20)  -4        PF (50)  25        Inversion (30)  0        Eversion (20)  0           MMT L                MMT  R Notes     LUMBAR Flexion      Extension      Lateral flexion       Rotation          MMT L MMT R Notes       HIP Flexion       Abduction       ER       IR       Extension             KNEE Flexion       Extension               ANKLE DF  N/a      PF       Inversion       Eversion        Repeated Movements: [] Normal  [] Abnormal [] N/A    Palpation:   Patient reported tenderness with palpation Warmth noted  Location: global ankle     Posture:   \    Bandages/Dressings/Incisions:  Not Applicable    Dermatomes: Abnormal findings listed below  All WNL    Myotomes: Abnormal findings listed below  All WNL    Reflexes: Abnormal findings listed below  All reflexes WNL (2+)    Specific Joint Mobility Testing/Accessory Motions:      Foot/Ankle:  hypomobile    Special Tests:  N/A    Gait:    Pattern: antalgic pattern, decreased adelso, and decreased step length  Assistive Device Used: no AD    Balance:  [x] WNL      [] NT       [] Dysfunction noted  Comment:     Falls Risk Assessment (30 days): Falls Risk assessed and no intervention required. Time Up and Go (TUG):   Not Assessed     ASSESSMENT   Assessment:   Rafy Black is a 70 y.o. female presenting today to Outpatient PT with signs and symptoms consistent with right ankle orif. Pt. presents with the functional impairments and activity limitations listed below and would benefit from Outpatient PT to address the below impairments as well as improve pain, and restore function.      Functional Impairments:   Decreased LE functional ROM and Decreased core/proximal hip strength and neuromuscular control    Functional Activity Limitations (from functional questionnaire and intake):  Reduced ability to tolerate prolonged functional positions  Reduced ability or difficulty

## 2023-12-13 ENCOUNTER — HOSPITAL ENCOUNTER (OUTPATIENT)
Dept: PHYSICAL THERAPY | Age: 78
Setting detail: THERAPIES SERIES
Discharge: HOME | End: 2023-12-13
Attending: ORTHOPAEDIC SURGERY
Payer: MEDICARE

## 2023-12-13 PROCEDURE — 97140 MANUAL THERAPY 1/> REGIONS: CPT | Performed by: SPECIALIST/TECHNOLOGIST

## 2023-12-13 PROCEDURE — 97110 THERAPEUTIC EXERCISES: CPT | Performed by: SPECIALIST/TECHNOLOGIST

## 2023-12-13 PROCEDURE — 97016 VASOPNEUMATIC DEVICE THERAPY: CPT | Performed by: SPECIALIST/TECHNOLOGIST

## 2023-12-13 NOTE — FLOWSHEET NOTE
[address] [place] [address] and Therapy [address] [place] [address] Mandy Kimbrough, [address] office: [phone] fax: [phone]      Physical Therapy: TREATMENT/PROGRESS NOTE   Patient: Milton Chi (21 y.o. female)  \"Eri\" Treatment Date: 2023   :  1945 MRN: [id number]   Visit #: 2   Insurance Allowable Auth Needed   BMN []Yes    [x]No    Insurance: Payor: Radha Ahn / Plan: Grace Charm PPO / Product Type: Medicare /   Insurance ID: [id number] - (Medicare Managed)  Secondary Insurance (if applicable):    Treatment Diagnosis: S/P left ankle ORIF  No diagnosis found.    Medical Diagnosis:    Right ankle pain, unspecified chronicity [M25.571]   Referring Physician: Ilene Combs MD  PCP: Uriah Batres                             Plan of care signed (Y/N):     Date of Patient follow up with Physician:      Progress Report/POC: NO  POC update due: (10 visits /OR [address], whichever is less)  2024         Preferred Language for Healthcare:   [x]English       []other:    SUBJECTIVE EXAMINATION     Patient Report/Comments: see eval     Test used Initial score  23   Pain Summary VAS 7 0/10   Functional questionnaire LEFS 76%    Other:                OBJECTIVE EXAMINATION     Observation:     Test measurements: DOS 10/11/23    Exercises/Interventions: WBAT in the boot    Therapeutic Ex (01283)  resistance Sets/time Reps Notes/Cues/Progressions   Ankle pumps  Circles CW, CCW   X 30  X 20 ea    Calf stretch with strap   3x30\"    TB ankle DF / PF   Red x 20 ea            SLR Flex   2 x 10    bridge   X 10           Seated HR   X 20           Wt. shift   10 x 5\" SP, HHA, with boot   Mini squats   2 x 10 HHA, with boot                 Manual Intervention (00679)  TIME     PROM / mobs  10'                                 NMR re-education (84526) resistance Sets/time Reps CUES NEEDED                                      Therapeutic

## 2023-12-26 ENCOUNTER — HOSPITAL ENCOUNTER (OUTPATIENT)
Dept: PHYSICAL THERAPY | Age: 78
Setting detail: THERAPIES SERIES
Discharge: HOME | End: 2023-12-26
Attending: ORTHOPAEDIC SURGERY
Payer: MEDICARE

## 2023-12-26 PROCEDURE — 97140 MANUAL THERAPY 1/> REGIONS: CPT | Performed by: PHYSICAL THERAPIST

## 2023-12-26 PROCEDURE — 97110 THERAPEUTIC EXERCISES: CPT | Performed by: PHYSICAL THERAPIST

## 2023-12-27 NOTE — FLOWSHEET NOTE
juni  [] Patient limited by pain  [] Patient limited by other medical complications  [] Other:     Return to Play: NA    Prognosis for POC: [x] Good [] Fair  [] Poor    Patient requires continued skilled intervention: [x] Yes  [] No      GOALS     Patient stated goal: \"to get better. Opal Salk Opal Salk Opaljhoana Khalilk \"   Status: [] Progressing: [] Met: [] Not Met: [] Adjusted     Therapist goals for Patient:   Short Term Goals: To be achieved in: 2 weeks  Independent in HEP and progression per patient tolerance, in order to progress toward full function and prevent re-injury. Status: [] Progressing: [] Met: [] Not Met: [] Adjusted  Patient will have a decrease in pain to 7/10 to help  facilitate improvement in movement, function, and ADLs as indicated by functional deficits. Status: [] Progressing: [] Met: [] Not Met: [] Adjusted     Long Term Goals: To be achieved in: 10 weeks  Disability index score of  % or less for the LEFS to assist with return top prior level of function. Status: [x] Progressing: [] Met: [] Not Met: [] Adjusted  Improve ankle AROM pronation and supination to [facility] to allow for proper joint functioning as indicated by patients functional deficits.   Status: [x] Progressing: [] Met: [] Not Met: [] Adjusted  Pt to improve strength to [facility] of df, pf, inv, ever to allow for proper muscle and joint use in functional mobility, ADLs and prior level of function  Status: [x] Progressing: [] Met: [] Not Met: [] Adjusted  Patient will return to Usual work, housework or activities, Usual recreational activities, getting in/out of bath, walking around house, putting shoes/socks on, squatting, lifting an object from the floor, light home activities, heavy home activities, getting in/out of a car, walk 2 blocks, walking on uneven ground, walk 1 mile, up/down 1 flight of stairs, stand for length of time, and sit for length of time without increased symptoms or restriction to work towards return to

## 2024-01-03 ENCOUNTER — HOSPITAL ENCOUNTER (OUTPATIENT)
Dept: PHYSICAL THERAPY | Age: 79
Setting detail: THERAPIES SERIES
Discharge: HOME | End: 2024-01-03
Attending: ORTHOPAEDIC SURGERY
Payer: MEDICARE

## 2024-01-03 PROCEDURE — 97110 THERAPEUTIC EXERCISES: CPT | Performed by: SPECIALIST/TECHNOLOGIST

## 2024-01-03 PROCEDURE — 97140 MANUAL THERAPY 1/> REGIONS: CPT | Performed by: SPECIALIST/TECHNOLOGIST

## 2024-01-03 PROCEDURE — 97016 VASOPNEUMATIC DEVICE THERAPY: CPT | Performed by: SPECIALIST/TECHNOLOGIST

## 2024-01-03 NOTE — FLOWSHEET NOTE
[facility]- Outpatient Rehabilitation and Therapy [address], [place] [address] office: [phone] fax: [phone]      Physical Therapy: TREATMENT/PROGRESS NOTE   Patient: Padmini Frederick (78 y.o. female)  \"Eri\" Treatment Date: 2024   :  1945 MRN: [id number]   Visit #: 7  Insurance Allowable Auth Needed   BMN []Yes    [x]No    Insurance: Payor: AETNA MEDICARE / Plan: AETNA MEDICARE-ADVANTAGE PPO / Product Type: Medicare /   Insurance ID: [id number] - (Medicare Managed)  Secondary Insurance (if applicable):    Treatment Diagnosis: S/P left ankle ORIF  No diagnosis found.   Medical Diagnosis:    Right ankle pain, unspecified chronicity [M25.571]   Referring Physician: Savage López MD  PCP: Jose Luciano                             Plan of care signed (Y/N):     Date of Patient follow up with Physician:      Progress Report/POC: NO  POC update due: (10 visits /OR AUTH LIMITS, whichever is less)  2024         Preferred Language for Healthcare:   [x]English       []other:    SUBJECTIVE EXAMINATION     Patient Report/Comments: Pt. Reports her ankle continues to feel good.  Pt. Reports she is able to use her AWW with no problems.  Pt. Reports her swelling will increase in her ankle with wt. Bearing activities.         Test used Initial score  23   Pain Summary VAS 7 0/10   Functional questionnaire LEFS 76%    Other:                OBJECTIVE EXAMINATION     Observation:     Test measurements: DOS 10/11/23    Exercises/Interventions: WBAT in the boot    Therapeutic Ex (72332)  resistance Sets/time Reps Notes/Cues/Progressions   Ankle pumps  Circles CW, CCW / ABC's   X 20 ea / x1    Calf stretch with strap   3x30\"    TB ankle DF / PF   Red x 20 ea            SLR Flex, abd   3 x 10 ea    bridge   2 X 10           Seated HR   X 30    BAPS - seated DF/PF, Inv/Ev  Circles CW,CCW   X 20 ea   X 15 ea            Wt. shift   Mini squats

## 2024-01-05 ENCOUNTER — HOSPITAL ENCOUNTER (OUTPATIENT)
Dept: PHYSICAL THERAPY | Age: 79
Setting detail: THERAPIES SERIES
Discharge: HOME | End: 2024-01-05
Attending: ORTHOPAEDIC SURGERY
Payer: MEDICARE

## 2024-01-05 PROCEDURE — 97140 MANUAL THERAPY 1/> REGIONS: CPT | Performed by: PHYSICAL THERAPIST

## 2024-01-05 PROCEDURE — 97110 THERAPEUTIC EXERCISES: CPT | Performed by: PHYSICAL THERAPIST

## 2024-01-05 PROCEDURE — 97016 VASOPNEUMATIC DEVICE THERAPY: CPT | Performed by: PHYSICAL THERAPIST

## 2024-01-08 ENCOUNTER — OFFICE VISIT (OUTPATIENT)
Dept: ORTHOPEDIC SURGERY | Age: 79
End: 2024-01-08

## 2024-01-08 VITALS — BODY MASS INDEX: 31.92 KG/M2 | WEIGHT: 187 LBS | HEIGHT: 64 IN

## 2024-01-08 DIAGNOSIS — M25.571 RIGHT ANKLE PAIN, UNSPECIFIED CHRONICITY: Primary | ICD-10-CM

## 2024-01-08 PROCEDURE — 99024 POSTOP FOLLOW-UP VISIT: CPT | Performed by: ORTHOPAEDIC SURGERY

## 2024-01-10 ENCOUNTER — HOSPITAL ENCOUNTER (OUTPATIENT)
Dept: PHYSICAL THERAPY | Age: 79
Setting detail: THERAPIES SERIES
Discharge: HOME | End: 2024-01-10
Attending: ORTHOPAEDIC SURGERY
Payer: MEDICARE

## 2024-01-10 PROCEDURE — 97110 THERAPEUTIC EXERCISES: CPT | Performed by: SPECIALIST/TECHNOLOGIST

## 2024-01-10 PROCEDURE — 97016 VASOPNEUMATIC DEVICE THERAPY: CPT | Performed by: SPECIALIST/TECHNOLOGIST

## 2024-01-10 PROCEDURE — 97140 MANUAL THERAPY 1/> REGIONS: CPT | Performed by: SPECIALIST/TECHNOLOGIST

## 2024-01-10 NOTE — PLAN OF CARE
from continued outpatient therapy services to address the deficits outlined in the patients goals    Treatment/Activity Tolerance:  [x] Patient tolerated treatment well [] Patient limited by fatique  [] Patient limited by pain  [] Patient limited by other medical complications  [] Other:     Return to Play: NA    Prognosis for POC: [x] Good [] Fair  [] Poor    Patient requires continued skilled intervention: [x] Yes  [] No      GOALS     Patient stated goal: \"to get better....\"   Status: [] Progressing: [] Met: [] Not Met: [] Adjusted     Therapist goals for Patient:   Short Term Goals: To be achieved in: 2 weeks  Independent in HEP and progression per patient tolerance, in order to progress toward full function and prevent re-injury.               Status: [] Progressing: [] Met: [] Not Met: [] Adjusted  Patient will have a decrease in pain to 7/10 to help  facilitate improvement in movement, function, and ADLs as indicated by functional deficits.              Status: [] Progressing: [] Met: [] Not Met: [] Adjusted     Long Term Goals: To be achieved in: 10 weeks  Disability index score of  % or less for the LEFS to assist with return top prior level of function.                  Status: [x] Progressing: [] Met: [] Not Met: [] Adjusted  Improve ankle AROM pronation and supination to WFL to allow for proper joint functioning as indicated by patients functional deficits.  Status: [x] Progressing: [] Met: [] Not Met: [] Adjusted  Pt to improve strength to WFL of df, pf, inv, ever to allow for proper muscle and joint use in functional mobility, ADLs and prior level of function  Status: [x] Progressing: [] Met: [] Not Met: [] Adjusted  Patient will return to Usual work, housework or activities, Usual recreational activities, getting in/out of bath, walking around house, putting shoes/socks on, squatting, lifting an object from the floor, light home activities, heavy home activities, getting in/out of a car, walk 2

## 2024-01-12 ENCOUNTER — HOSPITAL ENCOUNTER (OUTPATIENT)
Dept: PHYSICAL THERAPY | Age: 79
Setting detail: THERAPIES SERIES
Discharge: HOME | End: 2024-01-12
Attending: ORTHOPAEDIC SURGERY
Payer: MEDICARE

## 2024-01-12 PROCEDURE — 97140 MANUAL THERAPY 1/> REGIONS: CPT | Performed by: PHYSICAL THERAPIST

## 2024-01-12 PROCEDURE — 97110 THERAPEUTIC EXERCISES: CPT | Performed by: PHYSICAL THERAPIST

## 2024-01-13 NOTE — PLAN OF CARE
activities, getting in/out of a car, walk 2 blocks, walking on uneven ground, walk 1 mile, up/down 1 flight of stairs, stand for length of time, and sit for length of time without increased symptoms or restriction to work towards return to prior level of function.  Status: [x] Progressing: [] Met: [] Not Met: [] Adjusted  Patient will increase LE function to allow independence in all self-care activities.                                                                                                           Status: [x] Progressing: [] Met: [] Not Met: [] Adjusted    Overall Progression Towards Functional goals/ Treatment Progress Update:  [] Patient is progressing as expected towards functional goals listed.    [] Progression is slowed due to complexities/Impairments listed.  [] Progression has been slowed due to co-morbidities.  [x] Plan just implemented, too soon (<30days) to assess goals progression   [] Goals require adjustment due to lack of progress  [] Patient is not progressing as expected and requires additional follow up with physician  [] Other:     CHARGE CAPTURE     PT CHARGE GRID   CPT Code (TIMED) minutes # CPT Code (UNTIMED) #     Therex (63952)  30' 2  EVAL:LOW (61813 - Typically 20 minutes face-to-face)     Neuromusc. Re-ed (70692)    Re-Eval (82292)     Manual (48510) 25' 2  Estim Unattended (78888)     Ther. Act (12059)    Mech. Traction (21041)     Gait (17257)    Dry Needle 1-2 muscle (06158)     Aquatic Therex (99926)    Dry Needle 3+ muscle (20561)     Iontophoresis (06212)    VASO (41394)     Ultrasound (57176)    Group Therapy (06025)     Estim Attended (36311)    Canalith Repositioning (78398)     Other:    Other:    Total Timed Code Tx Minutes 55' 4                  Total Treatment Minutes 55'         Charge Justification:  (39340) THERAPEUTIC EXERCISE - Provided verbal/tactile cueing for activities related to strengthening, flexibility, endurance, ROM performed to prevent loss of range

## 2024-01-17 ENCOUNTER — HOSPITAL ENCOUNTER (OUTPATIENT)
Dept: PHYSICAL THERAPY | Age: 79
Setting detail: THERAPIES SERIES
Discharge: HOME | End: 2024-01-17
Attending: ORTHOPAEDIC SURGERY
Payer: MEDICARE

## 2024-01-17 PROCEDURE — 97140 MANUAL THERAPY 1/> REGIONS: CPT | Performed by: PHYSICAL THERAPIST

## 2024-01-17 PROCEDURE — 97110 THERAPEUTIC EXERCISES: CPT | Performed by: PHYSICAL THERAPIST

## 2024-01-19 ENCOUNTER — APPOINTMENT (OUTPATIENT)
Dept: PHYSICAL THERAPY | Age: 79
End: 2024-01-19
Attending: ORTHOPAEDIC SURGERY
Payer: MEDICARE

## 2024-01-20 NOTE — PLAN OF CARE
[facility]- Outpatient Rehabilitation and Therapy [address], [address], [place] [address] office: [phone] fax: [phone]     Physical Therapy Re-Certification Plan of Care    Dear Dr López  ,    We had the pleasure of treating the following patient for physical therapy services at [facility].  A summary of our findings can be found in the updated assessment below.  This includes our plan of care.  If you have any questions or concerns regarding these findings, please do not hesitate to contact me at [phone].  Thank you for the referral.     Physician Signature:________________________________Date:__________________  By signing above (or electronic signature), therapist’s plan is approved by physician      Overall Response to Treatment:   []Patient is responding well to treatment and improvement is noted with regards  to goals   []Patient should continue to improve in reasonable time if they continue HEP   []Patient has plateaued and is no longer responding to skilled PT intervention    []Patient is getting worse and would benefit from return to referring MD   []Patient unable to adhere to initial POC   []Other:     Date range of Visits:  -   Total Visits:  10    Physical Therapy: TREATMENT/PROGRESS NOTE   Patient: Padmini Frederick (78 y.o. female)  \"Eri\" Treatment Date: 2024   :  1945 MRN: [id number]   Visit #: 9  Insurance Allowable Auth Needed   BMN []Yes    [x]No    Insurance: Payor: AETNA MEDICARE / Plan: AETNA MEDICARE-ADVANTAGE PPO / Product Type: Medicare /   Insurance ID: [id number] - (Medicare Managed)  Secondary Insurance (if applicable):    Treatment Diagnosis: S/P left ankle ORIF  No diagnosis found.   Medical Diagnosis:    Right ankle pain, unspecified chronicity [M25.571]   Referring Physician: Savage López MD  PCP: Jose Luciano                             Plan of care signed (Y/N):     Date of

## 2024-01-24 ENCOUNTER — HOSPITAL ENCOUNTER (OUTPATIENT)
Dept: PHYSICAL THERAPY | Age: 79
Setting detail: THERAPIES SERIES
Discharge: HOME | End: 2024-01-24
Attending: ORTHOPAEDIC SURGERY
Payer: MEDICARE

## 2024-01-24 PROCEDURE — 97140 MANUAL THERAPY 1/> REGIONS: CPT | Performed by: SPECIALIST/TECHNOLOGIST

## 2024-01-24 PROCEDURE — 97110 THERAPEUTIC EXERCISES: CPT | Performed by: SPECIALIST/TECHNOLOGIST

## 2024-01-24 PROCEDURE — 97016 VASOPNEUMATIC DEVICE THERAPY: CPT | Performed by: SPECIALIST/TECHNOLOGIST

## 2024-01-24 NOTE — FLOWSHEET NOTE
[facility]- Outpatient Rehabilitation and Therapy [address], [place] [address] office: [phone] fax: [phone]      Physical Therapy: TREATMENT/PROGRESS NOTE   Patient: Padmini Frederick (78 y.o. female)  \"Eri\" Treatment Date: 2024   :  1945 MRN: [id number]   Visit #: 11  Insurance Allowable Auth Needed   BMN []Yes    [x]No    Insurance: Payor: AETNA MEDICARE / Plan: AETNA MEDICARE-ADVANTAGE PPO / Product Type: Medicare /   Insurance ID: [id number] - (Medicare Managed)  Secondary Insurance (if applicable):    Treatment Diagnosis: S/P left ankle ORIF  No diagnosis found.   Medical Diagnosis:    Right ankle pain, unspecified chronicity [M25.571]   Referring Physician: Savage López MD  PCP: Jose Luciano                             Plan of care signed (Y/N):     Date of Patient follow up with Physician:      Progress Report/POC: NO  POC update due: (10 visits /OR AUTH LIMITS, whichever is less)  2024         Preferred Language for Healthcare:   [x]English       []other:    SUBJECTIVE EXAMINATION     Patient Report/Comments: Pt. Reports she is not using her cane as much when walking.       Test used Initial score  23   Pain Summary VAS 7 0/10   Functional questionnaire LEFS 76% 44%   Other:                OBJECTIVE EXAMINATION     Observation:     Test measurements: DOS 10/11/23      Taken on 1/10/24 AROM Right ankle DF 5°, PF 26°, Inv 22°, Ev 5°    Exercises/Interventions:   Therapeutic Ex (08127)  resistance Sets/time Reps Notes/Cues/Progressions   Ankle pumps  Circles CW, CCW / ABC's      bike   5'     Calf stretch with strap   3x30\"    TB ankle DF / PF   Red x 20 ea            SLR Flex, abd   HEP   bridge   HEP   Seated ll/ld df stretch    4'    standing HR   X 20    BAPS - seated DF/PF, Inv/Ev  Circles CW,CCW   X 20 ea   X 15 ea    NV          Wt. shift   Mini squats + airex   2 x 10     Leg press    75# 2 x 10 FSU   4\"

## 2024-01-26 ENCOUNTER — HOSPITAL ENCOUNTER (OUTPATIENT)
Dept: PHYSICAL THERAPY | Age: 79
Setting detail: THERAPIES SERIES
Discharge: HOME | End: 2024-01-26
Attending: ORTHOPAEDIC SURGERY
Payer: MEDICARE

## 2024-01-26 PROCEDURE — 97140 MANUAL THERAPY 1/> REGIONS: CPT | Performed by: PHYSICAL THERAPIST

## 2024-01-26 PROCEDURE — 97110 THERAPEUTIC EXERCISES: CPT | Performed by: PHYSICAL THERAPIST

## 2024-01-27 NOTE — FLOWSHEET NOTE
flexibility, endurance, ROM performed to prevent loss of range of motion, maintain or improve muscular strength or increase flexibility, following either an injury or surgery.   (81202) MANUAL THERAPY -  Manual therapy techniques, 1 or more regions, each 15 minutes (Mobilization/manipulation, manual lymphatic drainage, manual traction) for the purpose of modulating pain, promoting relaxation,  increasing ROM, reducing/eliminating soft tissue swelling/inflammation/restriction, improving soft tissue extensibility and allowing for proper ROM for normal function with self care, mobility, lifting and ambulation  (41685) VASOPNEUMATIC    TREATMENT PLAN   Plan: Cont POC- Continue emphasis/focus on exercise progression, improving proper muscle recruitment and activation/motor control patterns, promoting relaxation, increasing ROM, reduce/eliminate soft tissue swelling/inflammation/restriction, and improving soft tissue extensibility. Next visit plan to do POC     Electronically Signed by  Padilla Timmons PT, MPT           Date: 01/26/2024     Note: If patient does not return for scheduled/recommended follow up visits, this note will serve as a discharge from care along with the most recent update on progress.

## 2024-01-31 ENCOUNTER — HOSPITAL ENCOUNTER (OUTPATIENT)
Dept: PHYSICAL THERAPY | Age: 79
Setting detail: THERAPIES SERIES
Discharge: HOME | End: 2024-01-31
Attending: ORTHOPAEDIC SURGERY
Payer: MEDICARE

## 2024-01-31 PROCEDURE — 97140 MANUAL THERAPY 1/> REGIONS: CPT

## 2024-01-31 PROCEDURE — 97112 NEUROMUSCULAR REEDUCATION: CPT

## 2024-01-31 PROCEDURE — 97110 THERAPEUTIC EXERCISES: CPT

## 2024-01-31 NOTE — FLOWSHEET NOTE
[facility]- Outpatient Rehabilitation and Therapy [address], [place] [address] office: [phone] fax: [phone]      Physical Therapy: TREATMENT/PROGRESS NOTE   Patient: Padmini Frederick (78 y.o. female)  \"Eri\" Treatment Date: 2024   :  1945 MRN: [id number]   Visit #: 12  Insurance Allowable Auth Needed   BMN []Yes    [x]No    Insurance: Payor: AETNA MEDICARE / Plan: AETNA MEDICARE-ADVANTAGE PPO / Product Type: Medicare /   Insurance ID: [id number] - (Medicare Managed)  Secondary Insurance (if applicable):    Treatment Diagnosis: S/P left ankle ORIF   Medical Diagnosis:    Right ankle pain, unspecified chronicity [M25.571]   Referring Physician: Savage López MD  PCP: Jose Luciano                             Plan of care signed (Y/N):     Date of Patient follow up with Physician:      Progress Report/POC: 3/1/24  POC update due: (10 visits /OR AUTH LIMITS, whichever is less)  3/1/2024         Preferred Language for Healthcare:   [x]English       []other:    SUBJECTIVE EXAMINATION     Patient Report/Comments: Feels like she is doing well. Still pretty fearful to go anywhere or do anything without her daughter or someone else with her due to fear of falling again. Was using the SPC for balance issues even prior to the most recent fall and fracture.      Test used Initial score  23   Pain Summary VAS 7 0/10   Functional questionnaire LEFS 76% 44%   Other:                OBJECTIVE EXAMINATION     Observation:     Test measurements: DOS 10/11/23      Taken on 1/10/24 AROM Right ankle DF 5°, PF 26°, Inv 22°, Ev 5°    Exercises/Interventions:   Therapeutic Ex (75569)  resistance Sets/time Reps Notes/Cues/Progressions   Ankle pumps  Circles CW, CCW / ABC's      Bike   5'     Calf stretch with strap   3x30\"    TB ankle DF / PF   Red x 20 ea     SLR Flex, abd   HEP   bridge   HEP   Seated ll/ld df stretch    4'    Standing HR  2 10

## 2024-02-02 ENCOUNTER — HOSPITAL ENCOUNTER (OUTPATIENT)
Dept: PHYSICAL THERAPY | Age: 79
Setting detail: THERAPIES SERIES
Discharge: HOME | End: 2024-02-02
Attending: ORTHOPAEDIC SURGERY
Payer: MEDICARE

## 2024-02-02 PROCEDURE — 97110 THERAPEUTIC EXERCISES: CPT | Performed by: SPECIALIST/TECHNOLOGIST

## 2024-02-02 PROCEDURE — 97140 MANUAL THERAPY 1/> REGIONS: CPT | Performed by: SPECIALIST/TECHNOLOGIST

## 2024-02-02 PROCEDURE — 97112 NEUROMUSCULAR REEDUCATION: CPT | Performed by: SPECIALIST/TECHNOLOGIST

## 2024-02-02 NOTE — FLOWSHEET NOTE
Adjusted    Overall Progression Towards Functional goals/ Treatment Progress Update:  [] Patient is progressing as expected towards functional goals listed.    [] Progression is slowed due to complexities/Impairments listed.  [] Progression has been slowed due to co-morbidities.  [x] Plan just implemented, too soon (<30days) to assess goals progression   [] Goals require adjustment due to lack of progress  [] Patient is not progressing as expected and requires additional follow up with physician  [] Other:     CHARGE CAPTURE     PT CHARGE GRID   CPT Code (TIMED) minutes # CPT Code (UNTIMED) #     Therex (05868)  15 1  EVAL:LOW (23845 - Typically 20 minutes face-to-face)     Neuromusc. Re-ed (53087) 10 1  Re-Eval (39404)     Manual (72190) 15 1  Estim Unattended (94913)     Ther. Act (32710)    Mech. Traction (45410)     Gait (88894)    Dry Needle 1-2 muscle (20560)     Aquatic Therex (48083)    Dry Needle 3+ muscle (20561)     Iontophoresis (14660)    VASO (55730)     Ultrasound (89349)    Group Therapy (01535)     Estim Attended (04434)    Canalith Repositioning (06511)     Other:    Other:    Total Timed Code Tx Minutes 40 3                  Total Treatment Minutes 40        Charge Justification:  (65926) THERAPEUTIC EXERCISE - Provided verbal/tactile cueing for activities related to strengthening, flexibility, endurance, ROM performed to prevent loss of range of motion, maintain or improve muscular strength or increase flexibility, following either an injury or surgery.   (80972) MANUAL THERAPY -  Manual therapy techniques, 1 or more regions, each 15 minutes (Mobilization/manipulation, manual lymphatic drainage, manual traction) for the purpose of modulating pain, promoting relaxation,  increasing ROM, reducing/eliminating soft tissue swelling/inflammation/restriction, improving soft tissue extensibility and allowing for proper ROM for normal function with self care, mobility, lifting and ambulation  (00384)

## 2024-02-07 ENCOUNTER — HOSPITAL ENCOUNTER (OUTPATIENT)
Dept: PHYSICAL THERAPY | Age: 79
Setting detail: THERAPIES SERIES
Discharge: HOME | End: 2024-02-07
Attending: ORTHOPAEDIC SURGERY
Payer: MEDICARE

## 2024-02-07 PROCEDURE — 97112 NEUROMUSCULAR REEDUCATION: CPT | Performed by: SPECIALIST/TECHNOLOGIST

## 2024-02-07 PROCEDURE — 97110 THERAPEUTIC EXERCISES: CPT | Performed by: SPECIALIST/TECHNOLOGIST

## 2024-02-07 PROCEDURE — 97140 MANUAL THERAPY 1/> REGIONS: CPT | Performed by: SPECIALIST/TECHNOLOGIST

## 2024-02-07 NOTE — FLOWSHEET NOTE
Fair  [] Poor    Patient requires continued skilled intervention: [x] Yes  [] No      GOALS     Patient stated goal: \"to get better....\"   Status: [] Progressing: [] Met: [] Not Met: [] Adjusted     Therapist goals for Patient:   Short Term Goals: To be achieved in: 2 weeks  Independent in HEP and progression per patient tolerance, in order to progress toward full function and prevent re-injury.               Status: [x] Progressing: [] Met: [] Not Met: [] Adjusted  Patient will have a decrease in pain to 7/10 to help  facilitate improvement in movement, function, and ADLs as indicated by functional deficits.              Status: [x] Progressing: [] Met: [] Not Met: [] Adjusted     Long Term Goals: To be achieved in: 10 weeks - reset on 1/17  Disability index score of  20% or less for the LEFS to assist with return top prior level of function.                  Status: [x] Progressing: [] Met: [] Not Met: [] Adjusted  Improve ankle AROM pronation and supination to WFL to allow for proper joint functioning as indicated by patients functional deficits.  Status: [x] Progressing: [] Met: [] Not Met: [] Adjusted  Pt to improve strength to WFL of df, pf, inv, ever to allow for proper muscle and joint use in functional mobility, ADLs and prior level of function  Status: [x] Progressing: [] Met: [] Not Met: [] Adjusted  Patient will return to Usual work, housework or activities, Usual recreational activities, getting in/out of bath, walking around house, putting shoes/socks on, squatting, lifting an object from the floor, light home activities, heavy home activities, getting in/out of a car, walk 2 blocks, walking on uneven ground, walk 1 mile, up/down 1 flight of stairs, stand for length of time, and sit for length of time without increased symptoms or restriction to work towards return to prior level of function.  Status: [x] Progressing: [] Met: [] Not Met: [] Adjusted  Patient will increase LE function to allow

## 2024-02-09 ENCOUNTER — APPOINTMENT (OUTPATIENT)
Dept: PHYSICAL THERAPY | Age: 79
End: 2024-02-09
Attending: ORTHOPAEDIC SURGERY
Payer: MEDICARE

## 2024-02-14 ENCOUNTER — HOSPITAL ENCOUNTER (OUTPATIENT)
Dept: PHYSICAL THERAPY | Age: 79
Setting detail: THERAPIES SERIES
Discharge: HOME | End: 2024-02-14
Attending: ORTHOPAEDIC SURGERY
Payer: MEDICARE

## 2024-02-14 PROCEDURE — 97110 THERAPEUTIC EXERCISES: CPT | Performed by: SPECIALIST/TECHNOLOGIST

## 2024-02-14 PROCEDURE — 97140 MANUAL THERAPY 1/> REGIONS: CPT | Performed by: SPECIALIST/TECHNOLOGIST

## 2024-02-14 NOTE — FLOWSHEET NOTE
LSD   4\" 2 x 10  SLB   3 x 30\" HHA    Lateral walk    1 lap  HHA 1/2 wall               Standing hip abd, ext    20x  NV   Manual Intervention (18608)  TIME     R ankle PROM/stretching   5  Supine, L talocrural mobs       L subtalar, metatarsal, calcaneal mobs  5                   NMR re-education (07038) resistance Sets/time Reps CUES NEEDED   Tandem stance balance on airex  Each direction   Standing alt march on airex  1 10 x ea                          Therapeutic Activity (38752)  Sets/time                                          Modalities:            Education/Home Exercise Program: HEP discussed and performed, see exercise grid      ASSESSMENT     Today's Assessment:   Gait mechanics look good. Still using a SPC when leaving her house, but admits she was using this for balance prior to the fall and fracture.  Standing heel raises still a challenge for patient. Still not able to control and move through full ROM due to weakness, endurance deficits. Definitely needs cont work. Continues to show good improvements with strength and ROM, though still challenged throughout. Continues to require skilled PT services to increase overall ROM, functional mobility, and endurance for greater functional activity tolerance.     Medical Necessity Documentation:  I certify that this patient meets the below criteria necessary for medical necessity for care and/or justification of therapy services:  The patient has functional impairments and/or activity limitations and would benefit from continued outpatient therapy services to address the deficits outlined in the patients goals    Treatment/Activity Tolerance:  [x] Patient tolerated treatment well [] Patient limited by fatique  [] Patient limited by pain  [] Patient limited by other medical complications  [] Other:     Return to Play: NA    Prognosis for POC: [x] Good [] Fair  [] Poor    Patient requires continued skilled intervention: [x] Yes  [] No      GOALS     Patient

## 2024-02-16 ENCOUNTER — HOSPITAL ENCOUNTER (OUTPATIENT)
Dept: PHYSICAL THERAPY | Age: 79
Setting detail: THERAPIES SERIES
Discharge: HOME | End: 2024-02-16
Attending: ORTHOPAEDIC SURGERY
Payer: MEDICARE

## 2024-02-16 PROCEDURE — 97140 MANUAL THERAPY 1/> REGIONS: CPT | Performed by: SPECIALIST/TECHNOLOGIST

## 2024-02-16 PROCEDURE — 97110 THERAPEUTIC EXERCISES: CPT | Performed by: SPECIALIST/TECHNOLOGIST

## 2024-02-16 NOTE — FLOWSHEET NOTE
[facility]- Outpatient Rehabilitation and Therapy [address], [place] [address] office: [phone] fax: [phone]      Physical Therapy: TREATMENT/PROGRESS NOTE   Patient: Padmini Frederick (78 y.o. female)  \"Eri\" Treatment Date: 2024   :  1945 MRN: [id number]   Visit #: 16  Insurance Allowable Auth Needed   BMN []Yes    [x]No    Insurance: Payor: AETNA MEDICARE / Plan: AETNA MEDICARE-ADVANTAGE PPO / Product Type: Medicare /   Insurance ID: [id number] - (Medicare Managed)  Secondary Insurance (if applicable):    Treatment Diagnosis: S/P Right ankle ORIF   Medical Diagnosis:    Right ankle pain, unspecified chronicity [M25.571]   Referring Physician: Savage López MD  PCP: Jose Luciano                             Plan of care signed (Y/N):     Date of Patient follow up with Physician:      Progress Report/POC: 3/1/24  POC update due: (10 visits /OR AUTH LIMITS, whichever is less)  3/15/2024         Preferred Language for Healthcare:   [x]English       []other:    SUBJECTIVE EXAMINATION     Patient Report/Comments: Pt. Reports her ankle does not bother her as much.  Pt. Reports her ankle is getting stronger but c/o having a lack of balance.          Test used Initial score  23   Pain Summary VAS 7 0/10   Functional questionnaire LEFS 76% 44%   Other:                OBJECTIVE EXAMINATION     Observation:     Test measurements: DOS 10/11/23      Taken on 1/10/24 AROM Right ankle DF 5°, PF 26°, Inv 22°, Ev 5°    Exercises/Interventions:   Therapeutic Ex (19745)  resistance Sets/time Reps Notes/Cues/Progressions   Ankle pumps  Circles CW, CCW / ABC's      Bike   5'     Calf stretch on slant    3x30\"    TB ankle DF / PF   HEP   SLR Flex, abd   HEP   bridge   HEP   Seated ll/ld df stretch    4'    Standing HR   20 75/25   BAPS - standing - DL  DF/PF, CW,CCW   - Each direction   Squats on airex   1 15 + UE support   Leg press    95# 2 x

## 2024-02-21 ENCOUNTER — HOSPITAL ENCOUNTER (OUTPATIENT)
Dept: PHYSICAL THERAPY | Age: 79
Setting detail: THERAPIES SERIES
Discharge: HOME | End: 2024-02-21
Attending: ORTHOPAEDIC SURGERY
Payer: MEDICARE

## 2024-02-21 PROCEDURE — 97140 MANUAL THERAPY 1/> REGIONS: CPT | Performed by: SPECIALIST/TECHNOLOGIST

## 2024-02-21 PROCEDURE — 97110 THERAPEUTIC EXERCISES: CPT | Performed by: SPECIALIST/TECHNOLOGIST

## 2024-02-21 NOTE — FLOWSHEET NOTE
[facility]- Outpatient Rehabilitation and Therapy [address], [place] [address] office: [phone] fax: [phone]      Physical Therapy: TREATMENT/PROGRESS NOTE   Patient: Padmini Frederick (78 y.o. female)  \"Eri\" Treatment Date: 2024   :  1945 MRN: [id number]   Visit #: 17  Insurance Allowable Auth Needed   BMN []Yes    [x]No    Insurance: Payor: AETNA MEDICARE / Plan: AETNA MEDICARE-ADVANTAGE PPO / Product Type: Medicare /   Insurance ID: [id number] - (Medicare Managed)  Secondary Insurance (if applicable):    Treatment Diagnosis: S/P Right ankle ORIF   Medical Diagnosis:    Right ankle pain, unspecified chronicity [M25.571]   Referring Physician: Savage López MD  PCP: Jose Luciano                             Plan of care signed (Y/N):     Date of Patient follow up with Physician:      Progress Report/POC: 3/1/24  POC update due: (10 visits /OR AUTH LIMITS, whichever is less)  3/22/2024         Preferred Language for Healthcare:   [x]English       []other:    SUBJECTIVE EXAMINATION     Patient Report/Comments: Pt. Reports her ankle feels good but will fatigue quickly.       Test used Initial score  23   Pain Summary VAS 7 0/10   Functional questionnaire LEFS 76% 44%   Other:                OBJECTIVE EXAMINATION     Observation:     Test measurements: DOS 10/11/23      Taken on 1/10/24 AROM Right ankle DF 5°, PF 26°, Inv 22°, Ev 5°    Exercises/Interventions:   Therapeutic Ex (04665)  resistance Sets/time Reps Notes/Cues/Progressions   Ankle pumps  Circles CW, CCW / ABC's      Bike   5'     Calf stretch on slant    3x30\"    TB ankle DF / PF   HEP   SLR Flex, abd   HEP   bridge   HEP   Seated ll/ld df stretch    4'    Standing HR   20 75/25   BAPS - standing - DL  DF/PF, CW,CCW   - Each direction   Squats on airex   1 15 + UE support   Leg press    95# 2 x 10 NV -    Step up & overs 6\" 1 15 LSD   4\" 2 x 10  SLB   3 x 30\" HHA

## 2024-02-23 ENCOUNTER — HOSPITAL ENCOUNTER (OUTPATIENT)
Dept: PHYSICAL THERAPY | Age: 79
Setting detail: THERAPIES SERIES
Discharge: HOME | End: 2024-02-23
Attending: ORTHOPAEDIC SURGERY
Payer: MEDICARE

## 2024-02-23 PROCEDURE — 97140 MANUAL THERAPY 1/> REGIONS: CPT | Performed by: SPECIALIST/TECHNOLOGIST

## 2024-02-23 PROCEDURE — 97110 THERAPEUTIC EXERCISES: CPT | Performed by: SPECIALIST/TECHNOLOGIST

## 2024-02-23 NOTE — FLOWSHEET NOTE
[facility]- Outpatient Rehabilitation and Therapy [address], [place] [address] office: [phone] fax: [phone]      Physical Therapy: TREATMENT/PROGRESS NOTE   Patient: Padmini Frederick (78 y.o. female)  \"Eri\" Treatment Date: 2024   :  1945 MRN: [id number]   Visit #: 18  Insurance Allowable Auth Needed   BMN []Yes    [x]No    Insurance: Payor: AETNA MEDICARE / Plan: AETNA MEDICARE-ADVANTAGE PPO / Product Type: Medicare /   Insurance ID: [id number] - (Medicare Managed)  Secondary Insurance (if applicable):    Treatment Diagnosis: S/P Right ankle ORIF   Medical Diagnosis:    Right ankle pain, unspecified chronicity [M25.571]   Referring Physician: Savage López MD  PCP: Jose Luciano                             Plan of care signed (Y/N):     Date of Patient follow up with Physician:      Progress Report/POC: 3/1/24  POC update due: (10 visits /OR AUTH LIMITS, whichever is less)  3/22/2024         Preferred Language for Healthcare:   [x]English       []other:    SUBJECTIVE EXAMINATION     Patient Report/Comments: Pt. Reports her ankle feels good but still has trouble with stairs.       Test used Initial score  23   Pain Summary VAS 7 0/10   Functional questionnaire LEFS 76% 44%   Other:                OBJECTIVE EXAMINATION     Observation:     Test measurements: DOS 10/11/23      Taken on 1/10/24 AROM Right ankle DF 5°, PF 26°, Inv 22°, Ev 5°    Exercises/Interventions:   Therapeutic Ex (86658)  resistance Sets/time Reps Notes/Cues/Progressions   Ankle pumps  Circles CW, CCW / ABC's      Bike   5'     Calf stretch on slant    3x30\"    TB ankle DF / PF   HEP   SLR Flex, abd   HEP   bridge   HEP   Seated ll/ld df stretch    4'    Standing HR   20 x 75/25   BAPS - standing - DL  DF/PF, CW,CCW   - Each direction   Squats on airex   1 15 + UE support   Leg press    95# 2 x 10    Step up & overs 6\" 1 15 LSD   SLB   3 x 30\" HHA

## 2024-02-28 ENCOUNTER — APPOINTMENT (OUTPATIENT)
Dept: PHYSICAL THERAPY | Age: 79
End: 2024-02-28
Attending: ORTHOPAEDIC SURGERY
Payer: MEDICARE

## 2024-03-01 ENCOUNTER — HOSPITAL ENCOUNTER (OUTPATIENT)
Dept: PHYSICAL THERAPY | Age: 79
Setting detail: THERAPIES SERIES
Discharge: HOME | End: 2024-03-01
Attending: ORTHOPAEDIC SURGERY
Payer: MEDICARE

## 2024-03-01 PROCEDURE — 97110 THERAPEUTIC EXERCISES: CPT | Performed by: SPECIALIST/TECHNOLOGIST

## 2024-03-01 PROCEDURE — 97140 MANUAL THERAPY 1/> REGIONS: CPT | Performed by: SPECIALIST/TECHNOLOGIST

## 2024-03-01 NOTE — FLOWSHEET NOTE
[facility]- Outpatient Rehabilitation and Therapy [address], [place] [address] office: [phone] fax: [phone]      Physical Therapy: TREATMENT/PROGRESS NOTE   Patient: Padmini Frederick (78 y.o. female)  \"Eri\" Treatment Date: 2024   :  1945 MRN: [id number]   Visit #: 19  Insurance Allowable Auth Needed   BMN []Yes    [x]No    Insurance: Payor: AETNA MEDICARE / Plan: AETNA MEDICARE-ADVANTAGE PPO / Product Type: Medicare /   Insurance ID: [id number] - (Medicare Managed)  Secondary Insurance (if applicable):    Treatment Diagnosis: S/P Right ankle ORIF   Medical Diagnosis:    Right ankle pain, unspecified chronicity [M25.571]   Referring Physician: Savage López MD  PCP: Jose Luciano                             Plan of care signed (Y/N):     Date of Patient follow up with Physician:      Progress Report/POC: 3/1/24  POC update due: (10 visits /OR AUTH LIMITS, whichever is less)  3/29/2024         Preferred Language for Healthcare:   [x]English       []other:    SUBJECTIVE EXAMINATION     Patient Report/Comments: Pt. Reports her ankle will randomly ache but has no pain.       Test used Initial score  23   Pain Summary VAS 7 0/10   Functional questionnaire LEFS 76% 44%   Other:                OBJECTIVE EXAMINATION     Observation:     Test measurements: DOS 10/11/23      Taken on 1/10/24 AROM Right ankle DF 5°, PF 26°, Inv 22°, Ev 5°    Exercises/Interventions:   Therapeutic Ex (97072)  resistance Sets/time Reps Notes/Cues/Progressions   Ankle pumps  Circles CW, CCW / ABC's      Bike   5'     Calf stretch on slant    3x30\"    TB ankle DF / PF   HEP   SLR Flex, abd   HEP   bridge   HEP   Seated ll/ld df stretch       Standing HR   20 x 75/25   BAPS - standing - DL  DF/PF, CW,CCW   - Each direction   Squats on airex   1 20 + UE support   Leg press    95# 3 x 10    Step up & overs 6\" 1 15 LSD   SLB   3 x 30\" HHA    Lateral walk

## 2024-03-06 ENCOUNTER — APPOINTMENT (OUTPATIENT)
Dept: PHYSICAL THERAPY | Age: 79
End: 2024-03-06
Attending: ORTHOPAEDIC SURGERY
Payer: MEDICARE

## 2024-03-08 ENCOUNTER — HOSPITAL ENCOUNTER (OUTPATIENT)
Dept: PHYSICAL THERAPY | Age: 79
Setting detail: THERAPIES SERIES
Discharge: HOME | End: 2024-03-08
Attending: ORTHOPAEDIC SURGERY
Payer: MEDICARE

## 2024-03-08 PROCEDURE — 97140 MANUAL THERAPY 1/> REGIONS: CPT | Performed by: SPECIALIST/TECHNOLOGIST

## 2024-03-08 PROCEDURE — 97110 THERAPEUTIC EXERCISES: CPT | Performed by: SPECIALIST/TECHNOLOGIST

## 2024-03-08 NOTE — FLOWSHEET NOTE
and progression per patient tolerance, in order to progress toward full function and prevent re-injury.               Status: [x] Progressing: [] Met: [] Not Met: [] Adjusted  Patient will have a decrease in pain to 7/10 to help  facilitate improvement in movement, function, and ADLs as indicated by functional deficits.              Status: [x] Progressing: [] Met: [] Not Met: [] Adjusted     Long Term Goals: To be achieved in: 10 weeks - reset on 1/17  Disability index score of  20% or less for the LEFS to assist with return top prior level of function.                  Status: [x] Progressing: [] Met: [] Not Met: [] Adjusted  Improve ankle AROM pronation and supination to WFL to allow for proper joint functioning as indicated by patients functional deficits.  Status: [x] Progressing: [] Met: [] Not Met: [] Adjusted  Pt to improve strength to WFL of df, pf, inv, ever to allow for proper muscle and joint use in functional mobility, ADLs and prior level of function  Status: [x] Progressing: [] Met: [] Not Met: [] Adjusted  Patient will return to Usual work, housework or activities, Usual recreational activities, getting in/out of bath, walking around house, putting shoes/socks on, squatting, lifting an object from the floor, light home activities, heavy home activities, getting in/out of a car, walk 2 blocks, walking on uneven ground, walk 1 mile, up/down 1 flight of stairs, stand for length of time, and sit for length of time without increased symptoms or restriction to work towards return to prior level of function.  Status: [x] Progressing: [] Met: [] Not Met: [] Adjusted  Patient will increase LE function to allow independence in all self-care activities.                                                                                                           Status: [x] Progressing: [] Met: [] Not Met: [] Adjusted    Overall Progression Towards Functional goals/ Treatment Progress Update:  [] Patient is

## 2024-03-13 ENCOUNTER — APPOINTMENT (OUTPATIENT)
Dept: PHYSICAL THERAPY | Age: 79
End: 2024-03-13
Attending: ORTHOPAEDIC SURGERY
Payer: MEDICARE

## 2024-03-15 ENCOUNTER — HOSPITAL ENCOUNTER (OUTPATIENT)
Dept: PHYSICAL THERAPY | Age: 79
Setting detail: THERAPIES SERIES
Discharge: HOME | End: 2024-03-15
Attending: ORTHOPAEDIC SURGERY
Payer: MEDICARE

## 2024-03-15 PROCEDURE — 97110 THERAPEUTIC EXERCISES: CPT | Performed by: SPECIALIST/TECHNOLOGIST

## 2024-03-15 PROCEDURE — 97140 MANUAL THERAPY 1/> REGIONS: CPT | Performed by: SPECIALIST/TECHNOLOGIST

## 2024-03-20 ENCOUNTER — APPOINTMENT (OUTPATIENT)
Dept: PHYSICAL THERAPY | Age: 79
End: 2024-03-20
Attending: ORTHOPAEDIC SURGERY
Payer: MEDICARE

## 2024-03-22 ENCOUNTER — HOSPITAL ENCOUNTER (OUTPATIENT)
Dept: PHYSICAL THERAPY | Age: 79
Setting detail: THERAPIES SERIES
End: 2024-03-22
Attending: ORTHOPAEDIC SURGERY
Payer: MEDICARE

## 2024-04-08 ENCOUNTER — OFFICE VISIT (OUTPATIENT)
Dept: ORTHOPEDIC SURGERY | Age: 79
End: 2024-04-08
Payer: MEDICARE

## 2024-04-08 VITALS — BODY MASS INDEX: 31.92 KG/M2 | WEIGHT: 187 LBS | HEIGHT: 64 IN

## 2024-04-08 DIAGNOSIS — S82.841D CLOSED BIMALLEOLAR FRACTURE OF RIGHT ANKLE WITH ROUTINE HEALING: Primary | ICD-10-CM

## 2024-04-08 PROCEDURE — 99213 OFFICE O/P EST LOW 20 MIN: CPT | Performed by: ORTHOPAEDIC SURGERY

## 2024-04-08 PROCEDURE — 1123F ACP DISCUSS/DSCN MKR DOCD: CPT | Performed by: ORTHOPAEDIC SURGERY

## 2024-04-16 NOTE — PROGRESS NOTES
4/8/2024     Reason for visit:  Status post ORIF of right ankle including medial and lateral malleolus on 10/11/2023    History of Present Illness:  Patient presents for postoperative evaluation.  She has been doing on quite well overall but does report some continued stiffness.    Objective:  Ht 1.626 m (5' 4\")   Wt 84.8 kg (187 lb)   BMI 32.10 kg/m²      Physical Exam:  The patient is well-appearing and in no apparent distress  Examination of the right ankle  Incisions are well-healed   Compartments are soft  5 out of 5 strength throughout distal muscle groups  Sensation is intact to light touch throughout all distributions  There is no calf swelling or tenderness  Palpable DP pulse, brisk cap refill, 2+ symmetric reflexes     Imaging:  3 view x-rays of the right ankle were obtained in the office today on 1/8/2024 and reviewed.  Postoperative changes consistent with medial and lateral malleolus ORIF.  Hardware is in excellent position without evidence of failure.  Interval healing at the fracture site.      Assessment:  Status post ORIF of right ankle including medial and lateral malleolus on 10/11/2023     Plan:  Overall the patient is doing well although I do think she would benefit from additional physical therapy.  Prescription was given.  She will return to see us as needed.    Greater than 20 minutes were spent with this encounter.  Time spent included evaluating the patient's chart prior to arrival.  Evaluating the patient in the office including history, physical examination, imaging reviewing, and counseling on next steps.  Lastly, time was spent discussing orders with my staff as well as providing documentation in the chart.                    Savage López MD            Orthopaedic Surgery Sports Medicine and Arthroscopy            [facility]            Team Physician [facility] ([place])      Disclaimer:  This note was dictated with voice

## 2024-04-17 ENCOUNTER — HOSPITAL ENCOUNTER (OUTPATIENT)
Dept: PHYSICAL THERAPY | Age: 79
Setting detail: THERAPIES SERIES
Discharge: HOME | End: 2024-04-17
Attending: ORTHOPAEDIC SURGERY
Payer: MEDICARE

## 2024-04-17 PROCEDURE — 97110 THERAPEUTIC EXERCISES: CPT | Performed by: PHYSICAL THERAPIST

## 2024-04-23 ENCOUNTER — APPOINTMENT (OUTPATIENT)
Dept: PHYSICAL THERAPY | Age: 79
End: 2024-04-23
Attending: ORTHOPAEDIC SURGERY
Payer: MEDICARE

## 2024-04-30 ENCOUNTER — HOSPITAL ENCOUNTER (OUTPATIENT)
Dept: PHYSICAL THERAPY | Age: 79
Setting detail: THERAPIES SERIES
End: 2024-04-30
Attending: ORTHOPAEDIC SURGERY
Payer: MEDICARE

## 2024-05-01 ENCOUNTER — HOSPITAL ENCOUNTER (OUTPATIENT)
Dept: PHYSICAL THERAPY | Age: 79
Setting detail: THERAPIES SERIES
Discharge: HOME | End: 2024-05-01
Attending: ORTHOPAEDIC SURGERY
Payer: MEDICARE

## 2024-05-01 PROCEDURE — 97140 MANUAL THERAPY 1/> REGIONS: CPT | Performed by: SPECIALIST/TECHNOLOGIST

## 2024-05-01 PROCEDURE — 97110 THERAPEUTIC EXERCISES: CPT | Performed by: SPECIALIST/TECHNOLOGIST

## 2024-05-01 NOTE — FLOWSHEET NOTE
[facility]- Outpatient Rehabilitation and Therapy [address], [place] [address] office: [phone] fax: [phone]      Physical Therapy: TREATMENT/PROGRESS NOTE   Patient: Padmini Frederick (78 y.o. female)  \"Eri\" Treatment Date: 2024   :  1945 MRN: [id number]   Visit #: 23  Insurance Allowable Auth Needed   BMN []Yes    [x]No    Insurance: Payor: AETNA MEDICARE / Plan: AETNA MEDICARE-ADVANTAGE PPO / Product Type: Medicare /   Insurance ID: [id number] - (Medicare Managed)  Secondary Insurance (if applicable):    Treatment Diagnosis: S/P Right ankle ORIF   Medical Diagnosis:    Right ankle pain, unspecified chronicity [M25.571]   Referring Physician: Savage López MD  PCP: Jose Luciano MD                             Plan of care signed (Y/N):     Date of Patient follow up with Physician:      Progress Report/POC: 3/1/24  POC update due: (10 visits /OR AUTH LIMITS, whichever is less)  2024         Preferred Language for Healthcare:   [x]English       []other:    SUBJECTIVE EXAMINATION     Patient Report/Comments:   Pt. Reports her ankle has no pain but c/o weakness in both legs.       Test used Initial score  23   Pain Summary VAS 7 0/10   Functional questionnaire LEFS 76% 52%   Other:                OBJECTIVE EXAMINATION     Observation:     Test measurements: DOS 10/11/23      Taken on 1/10/24 AROM Right ankle DF 5°, PF 26°, Inv 22°, Ev 5°    Exercises/Interventions:   Therapeutic Ex (02710)  resistance Sets/time Reps Notes/Cues/Progressions   Ankle pumps  Circles CW, CCW / ABC's      Bike   NV   Calf stretch on slant    3x30\"    TB ankle DF / PF   Red x 30 ea  HEP   SLR Flex, abd   HEP   bridge   HEP   Seated ll/ld df stretch       Standing HR   3 x 10    BAPS - standing - DL  DF/PF, CW,CCW   - Each direction   Squats on airex   1 15 + UE support   Leg press    65# 3 x 10 Step up & overs 6\" 1 15 LSD   4\" 2 x 10  SLB   3 x

## 2024-05-08 ENCOUNTER — HOSPITAL ENCOUNTER (OUTPATIENT)
Dept: PHYSICAL THERAPY | Age: 79
Setting detail: THERAPIES SERIES
End: 2024-05-08
Attending: ORTHOPAEDIC SURGERY
Payer: MEDICARE

## 2024-05-14 ENCOUNTER — HOSPITAL ENCOUNTER (OUTPATIENT)
Dept: PHYSICAL THERAPY | Age: 79
Setting detail: THERAPIES SERIES
Discharge: HOME | End: 2024-05-14
Attending: ORTHOPAEDIC SURGERY
Payer: MEDICARE

## 2024-05-14 PROCEDURE — 97110 THERAPEUTIC EXERCISES: CPT | Performed by: SPECIALIST/TECHNOLOGIST

## 2024-05-14 NOTE — FLOWSHEET NOTE
[facility]- Outpatient Rehabilitation and Therapy [address], [place] [address] office: [phone] fax: [phone]      Physical Therapy: TREATMENT/PROGRESS NOTE   Patient: Padmini Frederick (78 y.o. female)  \"Eri\" Treatment Date: 2024   :  1945 MRN: [id number]   Visit #: 24  Insurance Allowable Auth Needed   BMN []Yes    [x]No    Insurance: Payor: [employer] MEDICARE / Plan: AET[employer] MEDICARE-ADVANTAGE PPO / Product Type: Medicare /   Insurance ID: [id number] - (Medicare Managed)  Secondary Insurance (if applicable):    Treatment Diagnosis: S/P Right ankle ORIF   Medical Diagnosis:    Right ankle pain, unspecified chronicity [M25.571]   Referring Physician: Savage López MD  PCP: Jose Luciano MD                             Plan of care signed (Y/N):     Date of Patient follow up with Physician:      Progress Report/POC: 3/1/24  POC update due: (10 visits /OR AUTH LIMITS, whichever is less)  2024         Preferred Language for Healthcare:   [x]English       []other:    SUBJECTIVE EXAMINATION     Patient Report/Comments:   Pt. Reports her ankle is feeling better.       Test used Initial score  23   Pain Summary VAS 7 0/10   Functional questionnaire LEFS 76% 52%   Other:                OBJECTIVE EXAMINATION     Observation:     Test measurements: DOS 10/11/23      Taken on 1/10/24 AROM Right ankle DF 5°, PF 26°, Inv 22°, Ev 5°    Exercises/Interventions:   Therapeutic Ex (69090)  resistance Sets/time Reps Notes/Cues/Progressions   Ankle pumps  Circles CW, CCW / ABC's      Bike   NV   Calf stretch on slant    3x30\"    TB ankle DF / PF   HEP   SLR Flex, abd   HEP   bridge   HEP   Seated ll/ld df stretch       Standing HR   3 x 10    BAPS - standing - DL  DF/PF, CW,CCW   - Each direction   Squats on airex   1 20 + UE support   Leg press    75# 3 x 10 Step up & overs 6\" 1 15 LSD   4\" 2 x 10  SLB   3 x 30\" HHA    Lateral walk    Red TB 2

## 2024-05-22 ENCOUNTER — HOSPITAL ENCOUNTER (OUTPATIENT)
Dept: PHYSICAL THERAPY | Age: 79
Setting detail: THERAPIES SERIES
Discharge: HOME | End: 2024-05-22
Attending: ORTHOPAEDIC SURGERY
Payer: MEDICARE

## 2024-05-22 PROCEDURE — 97112 NEUROMUSCULAR REEDUCATION: CPT | Performed by: SPECIALIST/TECHNOLOGIST

## 2024-05-22 PROCEDURE — 97110 THERAPEUTIC EXERCISES: CPT | Performed by: SPECIALIST/TECHNOLOGIST

## 2024-05-22 NOTE — FLOWSHEET NOTE
[facility]- Outpatient Rehabilitation and Therapy [address], [place] [address] office: [phone] fax: [phone]      Physical Therapy: TREATMENT/PROGRESS NOTE   Patient: Padmini Frederick (78 y.o. female)  \"Eri\" Treatment Date: 2024   :  1945 MRN: [id number]   Visit #: 25  Insurance Allowable Auth Needed   BMN []Yes    [x]No    Insurance: Payor: AET[employer] MEDICARE / Plan: AETNA MEDICARE-ADVANTAGE PPO / Product Type: Medicare /   Insurance ID: [id number] - (Medicare Managed)  Secondary Insurance (if applicable):    Treatment Diagnosis: S/P Right ankle ORIF   Medical Diagnosis:    Right ankle pain, unspecified chronicity [M25.571]   Referring Physician: Savaeg López MD  PCP: Jose Luciano MD                             Plan of care signed (Y/N):     Date of Patient follow up with Physician:      Progress Report/POC: 3/1/24  POC update due: (10 visits /OR AUTH LIMITS, whichever is less)  2024         Preferred Language for Healthcare:   [x]English       []other:    SUBJECTIVE EXAMINATION     Patient Report/Comments:   Pt. Reports overall her ankle is feeling good.      Test used Initial score  23   Pain Summary VAS 7 0/10   Functional questionnaire LEFS 76% 52%   Other:                OBJECTIVE EXAMINATION     Observation:     Test measurements: DOS 10/11/23      Taken on 1/10/24 AROM Right ankle DF 5°, PF 26°, Inv 22°, Ev 5°    Exercises/Interventions:   Therapeutic Ex (07192)  resistance Sets/time Reps Notes/Cues/Progressions   Ankle pumps  Circles CW, CCW / ABC's      Bike   NV   Calf stretch on slant    3x30\"    TB ankle DF / PF   HEP   SLR Flex, abd   HEP   bridge   HEP   Seated ll/ld df stretch       Standing HR   3 x 10    BAPS - standing - DL  DF/PF, CW,CCW   - Each direction   Squats on airex   1 20 + UE support   Leg press    85# 3 x 10 Step up & overs 6\" 1 15 LSD   4\" 2 x 10  SLB + airex    3 x 30\" HHA    Lateral

## 2024-05-29 ENCOUNTER — APPOINTMENT (OUTPATIENT)
Dept: PHYSICAL THERAPY | Age: 79
End: 2024-05-29
Attending: ORTHOPAEDIC SURGERY
Payer: MEDICARE

## (undated) DEVICE — GAUZE,SPONGE,4"X4",8PLY,STRL,LF,10/TRAY: Brand: MEDLINE

## (undated) DEVICE — BUR SHV L13CM DIA4MM 8 FLUT OVL FOR RAP AGG BNE RESECT

## (undated) DEVICE — COVER LT HNDL BLU PLAS

## (undated) DEVICE — 3M™ STERI-DRAPE™ U-DRAPE 1015: Brand: STERI-DRAPE™

## (undated) DEVICE — KIT SHLDR STBL MARCO FOR SPIDER LIMB POS

## (undated) DEVICE — SYRINGE IRRIG 60ML SFT PLIABLE BLB EZ TO GRP 1 HND USE W/

## (undated) DEVICE — HYPODERMIC SAFETY NEEDLE: Brand: MAGELLAN

## (undated) DEVICE — CLEAR-TRAC 5.5 MM X 72 MM THREADED                                    CANNULA, WITH DISPOSABLE OBTURATOR,                                    BLUE, STERILE: Brand: CLEAR-TRAC

## (undated) DEVICE — PACK PROCEDURE SURG EXTREMITY MFFOP CUST

## (undated) DEVICE — BLADE SHV L13CM DIA4MM EXCALIBUR AGG COOLCUT

## (undated) DEVICE — C-ARM: Brand: UNBRANDED

## (undated) DEVICE — PLATE ES AD W 9FT CRD 2

## (undated) DEVICE — GARMENT,MEDLINE,DVT,INT,CALF,MED, GEN2: Brand: MEDLINE

## (undated) DEVICE — BANDAGE,ELASTIC,ESMARK,STERILE,6"X9',LF: Brand: MEDLINE

## (undated) DEVICE — GAUZE,SPONGE,4"X4",16PLY,XRAY,STRL,LF: Brand: MEDLINE

## (undated) DEVICE — DBD-PACK,SHOULDER III: Brand: MEDLINE

## (undated) DEVICE — PROBE ABLAT XL 90DEG ASPIR BPLR RF 1 PC ELECTRD ERGO HNDL

## (undated) DEVICE — BANDAGE COMPR W6INXL10YD ST M E WHITE/BEIGE

## (undated) DEVICE — SPONGE GZ W4XL8IN COT WVN 12 PLY

## (undated) DEVICE — BIT DRL L160MM DIA2.7MM CANN QUIK CPL ADJ STP REUSE FOR

## (undated) DEVICE — DRAPE,U/ SHT,SPLIT,PLAS,STERIL: Brand: MEDLINE

## (undated) DEVICE — DRESSING,GAUZE,XEROFORM,CURAD,1"X8",ST: Brand: CURAD

## (undated) DEVICE — MEDICINE CUP, GRADUATED, STER: Brand: MEDLINE

## (undated) DEVICE — 3M™ IOBAN™ 2 ANTIMICROBIAL INCISE DRAPE 6640EZ: Brand: IOBAN™ 2

## (undated) DEVICE — SUTURE MCRYL SZ 4-0 L27IN ABSRB UD L19MM PS-2 1/2 CIR PRIM Y426H

## (undated) DEVICE — SUTURE VCRL + SZ 1 L18IN ABSRB UD L36MM CT-1 1/2 CIR VCP841D

## (undated) DEVICE — INTENDED FOR TISSUE SEPARATION, AND OTHER PROCEDURES THAT REQUIRE A SHARP SURGICAL BLADE TO PUNCTURE OR CUT.: Brand: BARD-PARKER ® CARBON RIB-BACK BLADES

## (undated) DEVICE — GOWN SIRUS NONREIN XL W/TWL: Brand: MEDLINE INDUSTRIES, INC.

## (undated) DEVICE — CANNULA ARTHSCP L3CM ID8MM DBL DAM 1 PC MOLD LO PROF FLNG

## (undated) DEVICE — 1010 S-DRAPE TOWEL DRAPE 10/BX: Brand: STERI-DRAPE™

## (undated) DEVICE — T-DRAPE,EXTREMITY,STERILE: Brand: MEDLINE

## (undated) DEVICE — PROTECTOR ULN NRV PUR FOAM HK LOOP STRP ANATOMICALLY

## (undated) DEVICE — TUBING PMP L16FT MAIN DISP FOR AR-6400 AR-6475

## (undated) DEVICE — TUBING PMP L6FT CONT WAVE EXTN

## (undated) DEVICE — CANNULA ARTHSCP L7CM DIA7MM TRNSLUC THRD FLX W/ NO SQUIRT

## (undated) DEVICE — CHLORAPREP 26ML ORANGE

## (undated) DEVICE — EYE PROTECTOR FOAM MEDICHOICE

## (undated) DEVICE — TUBING, SUCTION, 1/4" X 12', STRAIGHT: Brand: MEDLINE

## (undated) DEVICE — PADDING CAST W4INXL4YD ST COT RAYON MICROPLEATED HIGHLY

## (undated) DEVICE — SURE SET-DOUBLE BASIN-LF: Brand: MEDLINE INDUSTRIES, INC.

## (undated) DEVICE — APPLICATOR MEDICATED 26 CC SOLUTION HI LT ORNG CHLORAPREP

## (undated) DEVICE — SUTURE PDS II SZ 1 L27IN ABSRB VLT CT-1 L36MM 1/2 CIR Z341H

## (undated) DEVICE — STERILE POLYISOPRENE POWDER-FREE SURGICAL GLOVES WITH EMOLLIENT COATING: Brand: PROTEXIS

## (undated) DEVICE — SOLUTION IV IRRIG LACTATED RINGERS 3000ML 2B7487

## (undated) DEVICE — GLOVE SURG SZ 65 L12IN FNGR THK87MIL WHT LTX FREE

## (undated) DEVICE — SUTURE VCRL + SZ 2-0 L18IN ABSRB UD CT1 L36MM 1/2 CIR VCP839D

## (undated) DEVICE — BIT DRL L125MM DIA2.7MM QUIK CPL 3 FLUT

## (undated) DEVICE — GLOVE ORANGE PI 7 1/2   MSG9075

## (undated) DEVICE — 3M™ WARMING BLANKET, LOWER BODY, 10 PER CASE, 42568: Brand: BAIR HUGGER™

## (undated) DEVICE — GLOVE SURG SZ 75 L12IN FNGR THK79MIL GRN LTX FREE

## (undated) DEVICE — PAD,NON-ADHERENT,3X8,STERILE,LF,1/PK: Brand: MEDLINE

## (undated) DEVICE — PRE OP PACK: Brand: MEDLINE INDUSTRIES, INC.

## (undated) DEVICE — GOWN,SIRUS,POLYRNF,BRTHSLV,XL,30/CS: Brand: MEDLINE

## (undated) DEVICE — SCISSORS SUT SM CLR CODE HNDL FOR OPN SURG CASES FIBERWIRE

## (undated) DEVICE — DRAPE 70X60IN SPLIT IMPERV ADHES STRIP

## (undated) DEVICE — SUTURE VCRL SZ 3-0 L27IN ABSRB UD L26MM CT-2 1/2 CIR J232H

## (undated) DEVICE — BIT DRL L110MM DIA2.5MM G QUIK CPL W/O STP REUSE

## (undated) DEVICE — MASC TURNOVER KIT: Brand: MEDLINE INDUSTRIES, INC.

## (undated) DEVICE — TUBING FLD MGMT Y DBL SPIK DUALWAVE

## (undated) DEVICE — GUIDEWIRE ORTH L150MM DIA1.25MM S STL NTHRD FOR 4MM CANN

## (undated) DEVICE — SCREW BNE L20MM DIA2.7MM CORT S STL ST FULL THRD FOR SM: Type: IMPLANTABLE DEVICE | Site: ANKLE | Status: NON-FUNCTIONAL

## (undated) DEVICE — GLOVE SURG SZ 9 THK91MIL LTX FREE SYN POLYISOPRENE ANTI

## (undated) DEVICE — BIT DRL L125MM DIA2MM QUIK CPL W/O STP REUSE

## (undated) DEVICE — INTENDED TO SUPPORT AND MAINTAIN THE POSITION OF AN ANESTHETIZED PATIENT DURING SURGERY: Brand: ERIN BEACH CHAIR FACE MASK

## (undated) DEVICE — TURNOVER KIT RM INF CTRL TECH

## (undated) DEVICE — PAD DRY FLOOR ABS 32X58IN GRN

## (undated) DEVICE — TOWEL,OR,DSP,ST,BLUE,STD,4/PK,20PK/CS: Brand: MEDLINE

## (undated) DEVICE — PAD,ABDOMINAL,5"X9",ST,LF,25/BX: Brand: MEDLINE INDUSTRIES, INC.

## (undated) DEVICE — SOLUTION IRRIG 500ML 0.9% SOD CHLO USP POUR PLAS BTL

## (undated) DEVICE — BLADE SHV L13CM DIA4MM TAPR TIP SCIS LIKE CUT OVL OUTER

## (undated) DEVICE — SURGICAL SET UP - SURE SET: Brand: MEDLINE INDUSTRIES, INC.

## (undated) DEVICE — SKIN AFFIX SURG ADHESIVE 72/CS 0.55ML: Brand: MEDLINE

## (undated) DEVICE — COVER,TABLE,HEAVY DUTY,77"X90",STRL: Brand: MEDLINE

## (undated) DEVICE — NEEDLE SPNL L3.5IN PNK HUB S STL REG WALL FIT STYL W/ QNCKE

## (undated) DEVICE — ZIMMER® STERILE DISPOSABLE TOURNIQUET CUFF WITH PLC, DUAL PORT, SINGLE BLADDER, 18 IN. (46 CM)

## (undated) DEVICE — 3M™ COBAN™ NL STERILE NON-LATEX SELF-ADHERENT WRAP, 2086S, 6 IN X 5 YD (15 CM X 4,5 M), 12 ROLLS/CASE: Brand: 3M™ COBAN™

## (undated) DEVICE — 3M™ STERI-DRAPE™ U-DRAPE 1067 1067 5/BX 4BX/CS/CTN&#X20;: Brand: STERI-DRAPE™

## (undated) DEVICE — INTENDED FOR TISSUE SEPARATION, AND OTHER PROCEDURES THAT REQUIRE A SHARP SURGICAL BLADE TO PUNCTURE OR CUT.: Brand: BARD-PARKER ® STAINLESS STEEL BLADES

## (undated) DEVICE — ZIMMER® STERILE DISPOSABLE TOURNIQUET CUFF WITH PLC, DUAL PORT, SINGLE BLADDER, 34 IN. (86 CM)

## (undated) DEVICE — GOWN,SIRUS,POLYRNF,BRTHSLV,XLN/XXL,18/CS: Brand: MEDLINE

## (undated) DEVICE — ELECTRODE PT RET AD L9FT HI MOIST COND ADH HYDRGEL CORDED

## (undated) DEVICE — STOCKINETTE ORTH W9XL36IN COT 2 PLY HLLW FOR HANDLING LMB